# Patient Record
Sex: MALE | Race: WHITE | NOT HISPANIC OR LATINO | Employment: FULL TIME | RURAL
[De-identification: names, ages, dates, MRNs, and addresses within clinical notes are randomized per-mention and may not be internally consistent; named-entity substitution may affect disease eponyms.]

---

## 2017-03-20 ENCOUNTER — OFFICE VISIT (OUTPATIENT)
Dept: FAMILY MEDICINE CLINIC | Facility: CLINIC | Age: 27
End: 2017-03-20

## 2017-03-20 VITALS
BODY MASS INDEX: 30.21 KG/M2 | OXYGEN SATURATION: 98 % | TEMPERATURE: 99.1 F | HEIGHT: 70 IN | HEART RATE: 134 BPM | RESPIRATION RATE: 18 BRPM | DIASTOLIC BLOOD PRESSURE: 88 MMHG | SYSTOLIC BLOOD PRESSURE: 132 MMHG | WEIGHT: 211 LBS

## 2017-03-20 DIAGNOSIS — R30.0 DYSURIA: ICD-10-CM

## 2017-03-20 DIAGNOSIS — R36.9 URETHRAL DISCHARGE: Primary | ICD-10-CM

## 2017-03-20 PROBLEM — Z00.00 WELLNESS EXAMINATION: Status: ACTIVE | Noted: 2017-03-20

## 2017-03-20 PROCEDURE — 99213 OFFICE O/P EST LOW 20 MIN: CPT | Performed by: FAMILY MEDICINE

## 2017-03-20 RX ORDER — DOXYCYCLINE HYCLATE 100 MG/1
100 TABLET, DELAYED RELEASE ORAL 2 TIMES DAILY
Qty: 28 TABLET | Refills: 0 | Status: SHIPPED | OUTPATIENT
Start: 2017-03-20 | End: 2017-05-02 | Stop reason: SDUPTHER

## 2017-03-20 NOTE — PROGRESS NOTES
Subjective   Enrique Rodríguez is a 27 y.o. male presenting with chief complaint of:   Chief Complaint   Patient presents with   • Penile Discharge       History of Present Illness :  Alone..   Has an acute issue today: penile discharge.  Never had before.  Unprotected intercoarse with stable female/relationship last week.  Then clear penile discharge with mild dysuria.  No hematuria, testicular pain, fever, uncontrolled BS, external sores.  No oral sex.      Other chronic problem/s to consider:  DM2: This has been present for years/over a year.  It is chronic.  It is controlled.  Home accuchecks run fasting 120-140, random rarely 200.   No hypoglycemia manifest as syncope/near syncope or diaphoretic/sweating episodes.  A1c below if present.  Sees endo.     The following portions of the patient's history were reviewed and updated as appropriate: allergies, current medications, past family history, past medical history, past social history, past surgical history and problem list.  TCC migrated if needed/reviewed.      Current Outpatient Prescriptions:   •  atorvastatin (LIPITOR) 40 MG tablet, Take 1 tablet by mouth Every Night., Disp: , Rfl:   •  cetirizine (zyrTEC) 10 MG tablet, Take 10 mg by mouth Daily As Needed for allergies., Disp: , Rfl:   •  Insulin Glargine (LANTUS SOLOSTAR) 100 UNIT/ML injection pen, Inject 70 Units under the skin Every Night., Disp: , Rfl:   •  irbesartan (AVAPRO) 150 MG tablet, Take 1 tablet by mouth Every Night., Disp: , Rfl:   •  NOVOLOG FLEXPEN 100 UNIT/ML solution pen-injector sc pen, Daily As Needed. Sliding scale/per Dr Martinez, Disp: , Rfl:   •  SUMAtriptan (IMITREX) 50 MG tablet, Take one tablet at onset of headache. May repeat dose one time in 2 hours if headache not relieved., Disp: , Rfl:     No Known Allergies    Review of Systems  GENERAL:  Active home/work. Sleep is ok. No fever now.  ENDO:  No syncope, near or diaphoretic sweaty spells.  BS Ok: without download.  HEENT: No  head injury  headache   CHEST: No chest wall tenderness or mass. No cough,  without wheeze, SOB; no hemoptysis.  CV: No chest pain, palpatations, ankle edema.  GI: No heartburn, dysphagia.  No abdominal pain, diarrhea, constipation, rectal bleeding, or melena.    :  Voids without dysuria, or incontience to completion.  ORTHO: No painful/swollen joints but various on /off sore.  No sore neck or back.  No acute neck or back pain without recent injury.   NEURO: No dizziness, weakness of extremities.  No numbness/parethesias.   PSYCH: No memory loss.  Mood good; not anxious, depressed but/and not suicidal.  Tolerated stress.     Results for orders placed or performed during the hospital encounter of 06/26/16   Clostridium difficile toxin, PCR   Result Value Ref Range    C difficile Toxin Gene NAAT Negative for Toxigenic C diff    Blood Gas, Arterial w Co-Ox   Result Value Ref Range    pH, Arterial 7.39 7.35 - 7.45    pCO2, Arterial 34 (L) 35 - 45 mmHg    pO2, Arterial 91 80 - 100 mmHg    Sodium 133 (L) 135 - 145 mmol/L    Potassium, Arterial 4.0 3.5 - 5.0 mmol/L    HCO3, Arterial 20 (L) 22 - 26 mmol/L    Base Excess -3.7 (L) -2.0 - 2.0    Hemoglobin, Blood Gas 17.9 14.0 - 18.0 g/dL    Hematocrit 53.0 (H) 38.0 - 51.0 %    Oxyhemoglobin 96 94 - 100 %    Carboxyhemoglobin 0.6 0.0 - 5.1 mmHg    Methemoglobin 0.5 0.4 - 1.5 %    Volume % O2 24.2 (H) 15 - 23 %    Site Right Brachial Arter     FIO2 Room Air    Acetone   Result Value Ref Range    Acetone Negative Negative   APTT   Result Value Ref Range    PTT 24.7 24.1 - 34.8 Seconds   Protime-INR   Result Value Ref Range    Protime 12.0 11.9 - 14.6 Seconds    INR 0.86 (L) 0.91 - 1.09   Lactic acid, plasma   Result Value Ref Range    Lactate 1.3 0.5 - 2.0 mmol/L   Comprehensive metabolic panel   Result Value Ref Range    Sodium 136 135 - 145 mmol/L    Potassium 4.5 3.5 - 5.3 mmol/L    Chloride 91 (L) 98 - 110 mmol/L    CO2 24 24 - 31 mmol/L    Glucose 218 (H) 70 - 100 mg/dL     BUN 55 (H) 5 - 21 mg/dL    Creatinine 3.17 (H) 0.5 - 1.4 mg/dL    Calcium 10.0 8.4 - 10.4 mg/dL    Total Protein 9.9 (H) 6.3 - 8.7 g/dL    Albumin 5.7 (H) 3.5 - 5.0 g/dL    Total Bilirubin 1.2 (H) 0.1 - 1.0 mg/dL    Alkaline Phosphatase 159 (H) 24 - 120 Units/L    AST (SGOT) 32 7 - 45 Units/L    ALT (SGPT) 40 0 - 54 Units/L    Anion Gap 21 (H) 4 - 13 mmol/L    Est GFR by Clearance 24 ml/min/1.732   Lipase   Result Value Ref Range    Lipase 70 23 - 203 Units/L   Amylase   Result Value Ref Range    Amylase 95 30 - 110 Units/L   Urinalysis w/Culture if Indicated   Result Value Ref Range    Color Yellow     Appearance, UA Cloudy (A) Clear    pH, UA 5.0 5.0 - 8.0    Specific Gravity, UA 1.034 (H) 1.005 - 1.030    Glucose, UA Negative Negative mg/dL    Ketones, UA 15 (A) Negative mg/dL    Bilirubin, UA Negative Negative    Blood, UA Trace (A) Negative    Protein,  (A) Negative mg/dL    Nitrite, UA Negative Negative    Leukocytes, UA Negative Negative    Urobilinogen, UA 1.0 0.2,1.0 E.U./dL   Hemoglobin A1c   Result Value Ref Range    Hemoglobin A1C 9.5 (H) 0.0 - 5.9 %   Urinalysis, Microscopic only   Result Value Ref Range    WBC, UA 6-12 (A) None Seen /hpf    RBC, UA 3-5 (A) None Seen /hpf    Bacteria, UA 1+ (A) None Seen /hpf   CK   Result Value Ref Range    Creatine Kinase 373 (H) 0 - 203 Units/L   Comprehensive metabolic panel   Result Value Ref Range    Sodium 139 135 - 145 mmol/L    Potassium 4.4 3.5 - 5.3 mmol/L    Chloride 102 98 - 110 mmol/L    CO2 25 24 - 31 mmol/L    Glucose 55 (L) 70 - 100 mg/dL    BUN 26 (H) 5 - 21 mg/dL    Creatinine 1.21 0.5 - 1.4 mg/dL    Calcium 8.8 8.4 - 10.4 mg/dL    Total Protein 7.0 6.3 - 8.7 g/dL    Albumin 4.2 3.5 - 5.0 g/dL    Total Bilirubin 1.2 (H) 0.1 - 1.0 mg/dL    Alkaline Phosphatase 102 24 - 120 Units/L    AST (SGOT) 25 7 - 45 Units/L    ALT (SGPT) 28 0 - 54 Units/L    Anion Gap 13 4 - 13 mmol/L    Est GFR by Clearance 73 ml/min/1.732   Shiga-like toxin antigen, EIA    Result Value Ref Range    Shiga Toxin 1 Negative Negative    Shiga Toxin 2 Negative Negative   Comprehensive metabolic panel   Result Value Ref Range    Sodium 143 135 - 145 mmol/L    Potassium 3.9 3.5 - 5.3 mmol/L    Chloride 105 98 - 110 mmol/L    CO2 26 24 - 31 mmol/L    Glucose 40 (C) 70 - 100 mg/dL    BUN 15 5 - 21 mg/dL    Creatinine 0.90 0.5 - 1.4 mg/dL    Calcium 8.8 8.4 - 10.4 mg/dL    Total Protein 6.6 6.3 - 8.7 g/dL    Albumin 3.8 3.5 - 5.0 g/dL    Total Bilirubin 0.7 0.1 - 1.0 mg/dL    Alkaline Phosphatase 86 24 - 120 Units/L    AST (SGOT) 22 7 - 45 Units/L    ALT (SGPT) 22 0 - 54 Units/L    Anion Gap 12 4 - 13 mmol/L    Est GFR by Clearance 103 ml/min/1.732   POCT glucose fingerstick   Result Value Ref Range    Glucose 268 (H) 70 - 100 mg/dL   POCT glucose fingerstick   Result Value Ref Range    Glucose 236 (H) 70 - 100 mg/dL   POCT glucose fingerstick   Result Value Ref Range    Glucose 199 (H) 70 - 100 mg/dL   POCT glucose fingerstick   Result Value Ref Range    Glucose 61 (L) 70 - 100 mg/dL   POCT glucose fingerstick   Result Value Ref Range    Glucose 212 (H) 70 - 100 mg/dL   POCT glucose fingerstick   Result Value Ref Range    Glucose 136 (H) 70 - 100 mg/dL   POCT glucose fingerstick   Result Value Ref Range    Glucose 295 (H) 70 - 100 mg/dL   POCT glucose fingerstick   Result Value Ref Range    Glucose 126 (H) 70 - 100 mg/dL   POCT glucose fingerstick   Result Value Ref Range    Glucose 51 (L) 70 - 100 mg/dL   POCT glucose fingerstick   Result Value Ref Range    Glucose 240 (H) 70 - 100 mg/dL   CBC and Differential   Result Value Ref Range    WBC 19.00 (H) 4.80 - 10.80 K/mcL    RBC 5.82 4.80 - 5.90 M/mcL    Hemoglobin 17.1 14.0 - 18.0 g/dL    Hematocrit 47.4 40.0 - 52.0 %    MCV 81.4 (L) 82.0 - 95.0 fL    MCH 29.4 28.0 - 32.0 pg    MCHC 36.1 (H) 33.0 - 36.0 gm/dL    RDW-SD 38.9 (L) 40.0 - 54.0 fL    RDW-CV 13.2 12.0 - 15.0 %    RDW 13.2 12 - 15 %    Platelets 279 130 - 400 K/mcL    Neutrophil  Rel % 77.80 39.00 - 78.00 %    Lymphocyte Rel % 11.10 (L) 15.00 - 45.00 %    Monocyte Rel % 10.50 4.00 - 12.00 %    Eosinophil Rel % 0.10 0.00 - 4.00 %    Basophil Rel % 0.10 0.00 - 2.00 %    Neutrophils Absolute 14.80 (H) 1.87 - 8.40 K/mcL    Lymphocytes Absolute 2.10 0.72 - 4.86 K/mcL    Monocytes Absolute 1.99 (H) 0.19 - 1.30 K/mcL    Eosinophils Absolute 0.02 0.00 - 0.70 K/mcL    Basophils Absolute 0.02 0.00 - 0.20 K/mcL    Differential Type Slide Scan     Platelet Morphology Normal     RBC Morphology Normal    CBC and Differential   Result Value Ref Range    WBC 8.65 4.80 - 10.80 K/mcL    RBC 5.00 4.80 - 5.90 M/mcL    Hemoglobin 14.5 14.0 - 18.0 g/dL    Hematocrit 41.6 40.0 - 52.0 %    MCV 83.2 82.0 - 95.0 fL    MCH 29.0 28.0 - 32.0 pg    MCHC 34.9 33.0 - 36.0 gm/dL    RDW-SD 40.0 40.0 - 54.0 fL    RDW-CV 13.4 12.0 - 15.0 %    RDW 13.4 12 - 15 %    Platelets 208 130 - 400 K/mcL    Neutrophil Rel % 52.70 39.00 - 78.00 %    Lymphocyte Rel % 31.40 15.00 - 45.00 %    Monocyte Rel % 14.20 (H) 4.00 - 12.00 %    Eosinophil Rel % 1.20 0.00 - 4.00 %    Basophil Rel % 0.20 0.00 - 2.00 %    Neutrophils Absolute 4.55 1.87 - 8.40 K/mcL    Lymphocytes Absolute 2.72 0.72 - 4.86 K/mcL    Monocytes Absolute 1.23 0.19 - 1.30 K/mcL    Eosinophils Absolute 0.10 0.00 - 0.70 K/mcL    Basophils Absolute 0.02 0.00 - 0.20 K/mcL    Differential Type AUTO     Platelet Morphology Normal    CBC and Differential   Result Value Ref Range    WBC 10.32 4.80 - 10.80 K/mcL    RBC 4.81 4.80 - 5.90 M/mcL    Hemoglobin 13.8 (L) 14.0 - 18.0 g/dL    Hematocrit 40.2 40.0 - 52.0 %    MCV 83.6 82.0 - 95.0 fL    MCH 28.7 28.0 - 32.0 pg    MCHC 34.3 33.0 - 36.0 gm/dL    RDW-SD 39.5 (L) 40.0 - 54.0 fL    RDW-CV 13.1 12.0 - 15.0 %    RDW 13.1 12 - 15 %    Platelets 192 130 - 400 K/mcL    Neutrophil Rel % 66.10 39.00 - 78.00 %    Lymphocyte Rel % 20.30 15.00 - 45.00 %    Monocyte Rel % 11.80 4.00 - 12.00 %    Eosinophil Rel % 1.40 0.00 - 4.00 %    Basophil  "Rel % 0.20 0.00 - 2.00 %    Neutrophils Absolute 6.82 1.87 - 8.40 K/mcL    Lymphocytes Absolute 2.10 0.72 - 4.86 K/mcL    Monocytes Absolute 1.22 0.19 - 1.30 K/mcL    Eosinophils Absolute 0.14 0.00 - 0.70 K/mcL    Basophils Absolute 0.02 0.00 - 0.20 K/mcL    nRBC 0 0 - 0 /100 WBC    Differential Type AUTO     Platelet Morphology Normal        CBC:     BMP:    THYROID:      Invalid input(s): T4  LIPID:      Invalid input(s): TOTAL CHOLESTROL, TRIGLYCERIDES  PSA:      Lab Results   Component Value Date    HGBA1C 9.5 (H) 06/26/2016       Lab Results   Component Value Date     06/28/2016     06/27/2016     06/26/2016    K 3.9 06/28/2016    K 4.4 06/27/2016    K 4.5 06/26/2016     06/28/2016     06/27/2016    CL 91 (L) 06/26/2016    CO2 26 06/28/2016    CO2 25 06/27/2016    CO2 24 06/26/2016    GLUCOSE 40 (C) 06/28/2016    GLUCOSE 55 (L) 06/27/2016    GLUCOSE 218 (H) 06/26/2016    BUN 15 06/28/2016    BUN 26 (H) 06/27/2016    BUN 55 (H) 06/26/2016    CREATININE 0.90 06/28/2016    CREATININE 1.21 06/27/2016    CREATININE 3.17 (H) 06/26/2016    CALCIUM 8.8 06/28/2016    CALCIUM 8.8 06/27/2016    CALCIUM 10.0 06/26/2016    EGFRCLEARA 103 06/28/2016    EGFRCLEARA 73 06/27/2016    EGFRCLEARA 24 06/26/2016       No results found for: PSA     Objective   Visit Vitals   • /88 (BP Location: Right arm, Patient Position: Sitting, Cuff Size: Large Adult)   • Pulse (!) 134   • Temp 99.1 °F (37.3 °C) (Oral)   • Resp 18   • Ht 70\" (177.8 cm)   • Wt 211 lb (95.7 kg)   • SpO2 98%   • BMI 30.28 kg/m2       Physical Exam  GENERAL:  Well nourished/developed in no acute distress. Obese   SKIN: Turgor excellent, without wound, rash, lesion.  HEENT: Normal cephalic without trauma.  Pupils equal round reactive to light. Extraocular motions full without nystagmus.   External canals nonobstructive nontender without reddness. Tymphatic membranes carmela with megan structures intact.   Oral cavity without growths, " exudates, and moist.  Posterior pharnyx without mass, obstruction, reddness.  No thyroidmegaly, mass, tenderness, lymphadenopathy and supple.  CV: Regular rhythm.  No murmur, gallop,  edema.  CHEST: No chest wall tenderness or mass.   LUNGS: Symmetric motion with clear to auscultation.  ABD: Soft, nontender without mass.   : Circ penis.  No external lesions.  Meatus adequate.  No penile sores/induration.  Clear fluid discharge.  Testes smooth 3 cm/nontender. 1 cm freely movable R inguinal node.   ORTHO: Symmetric extremities without swelling/point tenderness.   NEURO: CN 2-12 grossly intact.  Symmetric facies.  UE/LE   5/5 strength throughout.  Nonfocal use extremities. Speech clear.     PSYCH: Oriented x 3.  Pleasant calm, well kept.  Purposeful/directed conservation with intact short/long gross memory.     Assessment/Plan     1. Urethral discharge  Urethritis likely; likely chlaymdia   - Cadmium Std Profile, Blood / Ur  - doxycycline (DORYX) 100 MG enteric coated tablet; Take 1 tablet by mouth 2 (Two) Times a Day.  Dispense: 28 tablet; Refill: 0    2. Dysuria  same  - Cadmium Std Profile, Blood / Ur  - doxycycline (DORYX) 100 MG enteric coated tablet; Take 1 tablet by mouth 2 (Two) Times a Day.  Dispense: 28 tablet; Refill: 0      Rx: reviewed.   Warned sunshine protection.   LAB: reviewed/above  Advised safe sex.   There are no Patient Instructions on file for this visit.    Follow up: Return for as needed.

## 2017-03-21 LAB
B2 MICROGLOB UR-MCNC: 148 UG/L (ref 0–300)
B2 MICROGLOB/CREAT UR: 163 UG/G CREAT (ref 0–300)
CADMIUM 24H UR-MCNC: ABNORMAL UG/L
CADMIUM BLD-MCNC: 2 UG/L (ref 0–1.2)
CREAT UR-MCNC: 0.91 G/L (ref 0.3–3)

## 2017-04-26 ENCOUNTER — LAB (OUTPATIENT)
Dept: FAMILY MEDICINE CLINIC | Facility: CLINIC | Age: 27
End: 2017-04-26

## 2017-04-26 ENCOUNTER — TELEPHONE (OUTPATIENT)
Dept: FAMILY MEDICINE CLINIC | Facility: CLINIC | Age: 27
End: 2017-04-26

## 2017-04-26 DIAGNOSIS — Z20.9 EXPOSURE TO COMMUNICABLE DISEASE: Primary | ICD-10-CM

## 2017-04-26 NOTE — TELEPHONE ENCOUNTER
"Pt called \"on my last visit i was diagnosed of STD and still having some discharge and I just got off the boat today and want to know if I need to be seen? Or need more medication?\" pt has not been sexually active since dx  "

## 2017-05-01 LAB — C TRACH RRNA SPEC QL NAA+PROBE: NEGATIVE

## 2017-05-02 DIAGNOSIS — R30.0 DYSURIA: ICD-10-CM

## 2017-05-02 DIAGNOSIS — R36.9 URETHRAL DISCHARGE: ICD-10-CM

## 2017-05-02 RX ORDER — DOXYCYCLINE HYCLATE 100 MG/1
100 TABLET, DELAYED RELEASE ORAL 2 TIMES DAILY
Qty: 28 TABLET | Refills: 0 | Status: SHIPPED | OUTPATIENT
Start: 2017-05-02 | End: 2017-11-01

## 2017-10-17 ENCOUNTER — TELEPHONE (OUTPATIENT)
Dept: PODIATRY | Facility: CLINIC | Age: 27
End: 2017-10-17

## 2017-10-17 NOTE — TELEPHONE ENCOUNTER
Left message for Mr. Rodríguez of his appointment for tomorrow at 930 am and advised him if he had any questions or need to reschedule to please call the office at [phone]

## 2017-11-01 ENCOUNTER — OFFICE VISIT (OUTPATIENT)
Dept: FAMILY MEDICINE CLINIC | Facility: CLINIC | Age: 27
End: 2017-11-01

## 2017-11-01 VITALS
DIASTOLIC BLOOD PRESSURE: 74 MMHG | WEIGHT: 218 LBS | HEART RATE: 82 BPM | OXYGEN SATURATION: 99 % | SYSTOLIC BLOOD PRESSURE: 126 MMHG | BODY MASS INDEX: 31.21 KG/M2 | HEIGHT: 70 IN | RESPIRATION RATE: 18 BRPM | TEMPERATURE: 99 F

## 2017-11-01 DIAGNOSIS — I10 ESSENTIAL HYPERTENSION: Chronic | ICD-10-CM

## 2017-11-01 DIAGNOSIS — G43.909 MIGRAINE WITHOUT STATUS MIGRAINOSUS, NOT INTRACTABLE, UNSPECIFIED MIGRAINE TYPE: Chronic | ICD-10-CM

## 2017-11-01 DIAGNOSIS — Z00.00 WELLNESS EXAMINATION: ICD-10-CM

## 2017-11-01 DIAGNOSIS — IMO0001 UNCONTROLLED TYPE 1 DIABETES MELLITUS WITHOUT COMPLICATION: Primary | Chronic | ICD-10-CM

## 2017-11-01 DIAGNOSIS — E78.5 HYPERLIPIDEMIA, UNSPECIFIED HYPERLIPIDEMIA TYPE: Chronic | ICD-10-CM

## 2017-11-01 PROCEDURE — 99395 PREV VISIT EST AGE 18-39: CPT | Performed by: FAMILY MEDICINE

## 2017-12-12 ENCOUNTER — TELEPHONE (OUTPATIENT)
Dept: FAMILY MEDICINE CLINIC | Facility: CLINIC | Age: 27
End: 2017-12-12

## 2017-12-12 NOTE — TELEPHONE ENCOUNTER
FYI: Patient had me hold off until after 12.6.17 for the Endocrinology referral. I finally reached him and he is going to just continue with the one he has and he has an appointment this week. Cancelling our referral.

## 2018-06-18 DIAGNOSIS — R30.0 DYSURIA: ICD-10-CM

## 2018-06-18 DIAGNOSIS — R36.9 URETHRAL DISCHARGE: ICD-10-CM

## 2018-06-18 RX ORDER — DOXYCYCLINE HYCLATE 100 MG/1
100 TABLET, DELAYED RELEASE ORAL 2 TIMES DAILY
Qty: 28 TABLET | Refills: 0 | Status: SHIPPED | OUTPATIENT
Start: 2018-06-18 | End: 2018-07-02

## 2019-03-25 ENCOUNTER — TELEPHONE (OUTPATIENT)
Dept: FAMILY MEDICINE CLINIC | Facility: CLINIC | Age: 29
End: 2019-03-25

## 2019-03-25 RX ORDER — AZITHROMYCIN 250 MG/1
TABLET, FILM COATED ORAL
Qty: 6 TABLET | Refills: 0 | Status: ON HOLD | OUTPATIENT
Start: 2019-03-25 | End: 2019-10-26

## 2019-03-25 NOTE — TELEPHONE ENCOUNTER
Allergies   Allergen Reactions   • Other      CATS- Sneezing     If no new allergies; may have Z pack as long as not on an antidepressant or heart Rx

## 2019-03-25 NOTE — TELEPHONE ENCOUNTER
CALLED AND STATES HE HAS A HEAD COLD.  HEAD STOPPED UP, COUGH,   WANTS TO KNOW IF YOU WILL CALL HIM IN SOMETHING.  [phone]  NATAN

## 2019-08-06 NOTE — PROGRESS NOTES
Patient called this morning c/o rash call was transferred to RN pool at the [facility] 15 minutes later RN calls [facility] office saying they spoke to patient possible poison ivy they were suppose to send message to pcp ? I don't see any messages ? Subjective   Enrique Rodríguez is a 27 y.o. male presenting with chief complaint of:   Chief Complaint   Patient presents with   • Annual Exam     work pe       History of Present Illness :  Alone.  Here for primarily an acute issue today; needs form for work.   Has multiple problems to consider; ? interval appointment.  One of these problems is DM1.  Had been seeing Danyell; was referred to Faith/Margaret (who is retiring).  He is not interested in the pump Margaret recommended.         Other chronic problem/s to consider:   HTN.  The HTN has been present for years/it is chronic.  The HTN is assumed essential/without testing needed to look for other.  The HTN is controlled manifest by todays blood pressure and no home monitoring.  Associated illness below.   DM1: This has been present for years/over a year.  It is chronic.  It is generally not controlled.  Home accuchecks run fasting 100, random 140.   No hypoglycemia manifest as syncope/near syncope or diaphoretic/sweating episodes.  A1c below if available.  Diet loose.   Hyperlipidemia: This has been present for years/over a year.  It is chronic.   It is generally controlled.   .  Labs are needed periodic to monitor condition/safetly.   Rx therapy Lipitor  Migraines:  Years/chronic.  Has opinion migraines.  Imitrex has helped; has one a month but not bad enough to want imitrex.     Has an/another acute issue today: none.    The following portions of the patient's history were reviewed and updated as appropriate: allergies, current medications, past family history, past medical history, past social history, past surgical history and problem list.  Records acquired and reviewed; TCC migrated.      Current Outpatient Prescriptions:   •  atorvastatin (LIPITOR) 40 MG tablet, Take 1 tablet by mouth Every Night., Disp: , Rfl:   •  cetirizine (zyrTEC) 10 MG tablet, Take 10 mg by mouth Daily As Needed for allergies., Disp: , Rfl:   •  Insulin Degludec (TRESIBA FLEXTOUCH) 200  UNIT/ML solution pen-injector, Inject 90 Units under the skin Every Night., Disp: , Rfl:   •  irbesartan (AVAPRO) 150 MG tablet, Take 1 tablet by mouth Every Night., Disp: , Rfl:   •  NOVOLOG FLEXPEN 100 UNIT/ML solution pen-injector sc pen, Daily As Needed. Sliding scale/per Dr Martinez, Disp: , Rfl:   •  SUMAtriptan (IMITREX) 50 MG tablet, Take one tablet at onset of headache. May repeat dose one time in 2 hours if headache not relieved., Disp: , Rfl:     Allergies   Allergen Reactions   • Other      CATS- Sneezing       Review of Systems  GENERAL:  Active work/home and some weights. Sleep is ok; apnea denied. No fever now.  ENDO:  No syncope, near or diaphoretic sweaty spells.  BS Ok: without download noted.  HEENT: No head injury occ/same headache,  No vision change, No significant hearing loss.  Ears without pain/drainage.  No sore throat.  No significant nasal/sinus congestion/drainage. No epistaxis.  CHEST: No chest wall tenderness or mass. No cough,  without wheeze.  No SOB; no hemoptysis.  CV: No chest pain, palpitations, ankle edema.  GI: No heartburn, dysphagia.  No abdominal pain, diarrhea, constipation.  No rectal bleeding, or melena.    :  Voids without dysuria, or incontinence to completion.  ORTHO: No painful/swollen joints but various on /off sore.  No sore neck or back.  No acute neck or back pain without recent injury.   NEURO: No dizziness, weakness of extremities.  No numbness/paresthesias.   PSYCH: No memory loss.  Mood good; not that anxious, depressed but/and not suicidal.  Tries to tolerate stress .     Results for orders placed or performed in visit on 04/26/17   Chlamydia Trach, DNA Probe   Result Value Ref Range    Chlamydia trachomatis, TATIANA Negative Negative       Lab Results   Component Value Date    HGBA1C 9.5 (H) 06/26/2016       Lab Results   Component Value Date     06/28/2016     06/27/2016     06/26/2016    K 3.9 06/28/2016    K 4.4 06/27/2016    K 4.5  "06/26/2016     06/28/2016     06/27/2016    CL 91 (L) 06/26/2016    CO2 26 06/28/2016    CO2 25 06/27/2016    CO2 24 06/26/2016    GLUCOSE 40 (C) 06/28/2016    GLUCOSE 55 (L) 06/27/2016    GLUCOSE 218 (H) 06/26/2016    BUN 15 06/28/2016    BUN 26 (H) 06/27/2016    BUN 55 (H) 06/26/2016    CREATININE 0.90 06/28/2016    CREATININE 1.21 06/27/2016    CREATININE 3.17 (H) 06/26/2016    CALCIUM 8.8 06/28/2016    CALCIUM 8.8 06/27/2016    CALCIUM 10.0 06/26/2016    EGFRCLEARA 103 06/28/2016    EGFRCLEARA 73 06/27/2016    EGFRCLEARA 24 06/26/2016       No results found for: PSA     Lab Results:  CBC:    Lab Results - Last 18 Months  Lab Units 06/28/16  0534 06/27/16  0542 06/26/16  0656 06/26/16  0651   WBC K/mcL 10.32 8.65 19.00*  --    HEMATOCRIT % 40.2 41.6 47.4 53.0*     CMP:    Lab Results - Last 18 Months  Lab Units 06/28/16  0534 06/27/16  0542 06/26/16  0656 06/26/16  0651   SODIUM mmol/L 143 139 136 133*   CHLORIDE mmol/L 105 102 91*  --    TOTAL CO2 mmol/L 26 25 24  --    BUN mg/dL 15 26* 55*  --    CREATININE mg/dL 0.90 1.21 3.17*  --    CALCIUM mg/dL 8.8 8.8 10.0  --      THYROID:No results for input(s): TSH, T3FREE, FREET4 in the last 22706 hours.    Invalid input(s): T3, T4  A1C:    Lab Results - Last 18 Months  Lab Units 06/26/16  0656   HEMOGLOBIN A1C % 9.5*       Objective   /74  Pulse 82  Temp 99 °F (37.2 °C) (Oral)   Resp 18  Ht 70\" (177.8 cm)  Wt 218 lb (98.9 kg)  SpO2 99%  BMI 31.28 kg/m2    Physical Exam  GENERAL:  Well nourished/developed in no acute distress.  Muscular.   SKIN: Turgor excellent, without wound, rash, lesion  HEENT: Normal cephalic without trauma.  Pupils equal round reactive to light. Extraocular motions full without nystagmus.   External canals nonobstructive nontender without reddness. Tymphatic membranes carmela with megan structures intact.   Oral cavity without growths, exudates, and moist.  Posterior pharynx without mass, obstruction, redness.  No " thyromegaly, mass, tenderness, lymphadenopathy and supple.  CV: Regular rhythm.  No murmur, gallop,  edema. Posterior pulses intact.  No carotid bruits.  CHEST: No chest wall tenderness or mass.   LUNGS: Symmetric motion with clear to auscultation.   ABD: Soft, nontender without mass.   ORTHO: Symmetric extremities without swelling/point tenderness.  Full gross range of motion.  NEURO: CN 2-12 grossly intact.  Symmetric facies. 1/4 x bicep knee equal reflexes.  UE/LE   5/5 strength throughout.  Nonfocal use extremities. Speech clear.    PSYCH: Oriented x 3.  Pleasant calm, well kept.  Purposeful/directed conservation with intact short/long gross memory.     Assessment/Plan     1. Uncontrolled type 1 diabetes mellitus without complication  He has option of [facility]  - Ambulatory Referral to Endocrinology    2. Essential hypertension  controlled    3. Hyperlipidemia, unspecified hyperlipidemia type  lipitor treated    4. Migraine without status migrainosus, not intractable, unspecified migraine type  Doing well    5. Wellness examination-done      Rx: reviewed.  Any other changes above and:   LAB: reviewed/above.  Orders above and:   Wrap-up/other instructions: discussed as applicable  Regular cardio exercise something everyone should consider and try to do; even if health limitations (ie find that exercise UE/LE/cardio that they can tolerate).  Normal weight a goal for everyone.  Healthy diet helpful for weight management, illness prevention.    Flu shot discussed; missed and he was called and asked if he wants to come back.   There are no Patient Instructions on file for this visit.    Follow up: Return for Dr Louie-at least yearly.  Future Appointments  Date Time Provider Department Center   11/7/2018 9:30 AM Wyatt Louie MD [facility] None

## 2019-10-25 ENCOUNTER — TELEPHONE (OUTPATIENT)
Dept: FAMILY MEDICINE CLINIC | Facility: CLINIC | Age: 29
End: 2019-10-25

## 2019-10-25 RX ORDER — ONDANSETRON 4 MG/1
4 TABLET, FILM COATED ORAL EVERY 8 HOURS PRN
Qty: 6 TABLET | Refills: 0 | Status: SHIPPED | OUTPATIENT
Start: 2019-10-25 | End: 2022-01-26

## 2019-10-25 NOTE — TELEPHONE ENCOUNTER
Pt called for something to help with vomiting, he states he has been having problems most of the day with his stomach being upset.

## 2019-10-25 NOTE — TELEPHONE ENCOUNTER
He would come in if you want him to or just Zofran and come in Monday.  He was open to what you wanted.

## 2019-10-25 NOTE — TELEPHONE ENCOUNTER
Try zofran 4 mg every 6 #8  Assume he will work hard to watch his BS and keeping some fluids down  Look forward to seeing him Monday

## 2019-10-26 ENCOUNTER — APPOINTMENT (OUTPATIENT)
Dept: GENERAL RADIOLOGY | Facility: HOSPITAL | Age: 29
End: 2019-10-26

## 2019-10-26 ENCOUNTER — HOSPITAL ENCOUNTER (INPATIENT)
Facility: HOSPITAL | Age: 29
LOS: 1 days | Discharge: HOME | End: 2019-10-27
Attending: FAMILY MEDICINE | Admitting: FAMILY MEDICINE

## 2019-10-26 DIAGNOSIS — E10.10 DIABETIC KETOACIDOSIS WITHOUT COMA ASSOCIATED WITH TYPE 1 DIABETES MELLITUS (HCC): Primary | ICD-10-CM

## 2019-10-26 DIAGNOSIS — N17.9 ACUTE RENAL FAILURE, UNSPECIFIED ACUTE RENAL FAILURE TYPE (HCC): ICD-10-CM

## 2019-10-26 LAB
ACETONE BLD QL: ABNORMAL
ALBUMIN SERPL-MCNC: 4.6 G/DL (ref 3.5–5.2)
ALBUMIN SERPL-MCNC: 4.8 G/DL (ref 3.5–5.2)
ALBUMIN/GLOB SERPL: 1.5 G/DL
ALBUMIN/GLOB SERPL: 1.8 G/DL
ALP SERPL-CCNC: 106 U/L (ref 39–117)
ALP SERPL-CCNC: 113 U/L (ref 39–117)
ALT SERPL W P-5'-P-CCNC: 19 U/L (ref 1–41)
ALT SERPL W P-5'-P-CCNC: 23 U/L (ref 1–41)
ANION GAP SERPL CALCULATED.3IONS-SCNC: 15 MMOL/L (ref 5–15)
ANION GAP SERPL CALCULATED.3IONS-SCNC: 17 MMOL/L (ref 5–15)
ANION GAP SERPL CALCULATED.3IONS-SCNC: 18 MMOL/L (ref 5–15)
ANION GAP SERPL CALCULATED.3IONS-SCNC: 28 MMOL/L (ref 5–15)
ANION GAP SERPL CALCULATED.3IONS-SCNC: 33 MMOL/L (ref 5–15)
ARTERIAL PATENCY WRIST A: ABNORMAL
AST SERPL-CCNC: 12 U/L (ref 1–40)
AST SERPL-CCNC: 13 U/L (ref 1–40)
ATMOSPHERIC PRESS: 736 MMHG
ATMOSPHERIC PRESS: 740 MMHG
ATMOSPHERIC PRESS: 742 MMHG
ATMOSPHERIC PRESS: 745 MMHG
BASE EXCESS BLDA CALC-SCNC: -13.1 MMOL/L (ref 0–2)
BASE EXCESS BLDV CALC-SCNC: -13.6 MMOL/L (ref 0–2)
BASE EXCESS BLDV CALC-SCNC: -3.8 MMOL/L (ref 0–2)
BASE EXCESS BLDV CALC-SCNC: -6.8 MMOL/L (ref 0–2)
BASOPHILS # BLD AUTO: 0.03 10*3/MM3 (ref 0–0.2)
BASOPHILS # BLD AUTO: 0.04 10*3/MM3 (ref 0–0.2)
BASOPHILS NFR BLD AUTO: 0.1 % (ref 0–1.5)
BASOPHILS NFR BLD AUTO: 0.2 % (ref 0–1.5)
BDY SITE: ABNORMAL
BILIRUB SERPL-MCNC: 0.5 MG/DL (ref 0.2–1.2)
BILIRUB SERPL-MCNC: 0.5 MG/DL (ref 0.2–1.2)
BILIRUB UR QL STRIP: NEGATIVE
BODY TEMPERATURE: 37 C
BUN BLD-MCNC: 29 MG/DL (ref 6–20)
BUN BLD-MCNC: 36 MG/DL (ref 6–20)
BUN BLD-MCNC: 42 MG/DL (ref 6–20)
BUN BLD-MCNC: 48 MG/DL (ref 6–20)
BUN BLD-MCNC: 51 MG/DL (ref 6–20)
BUN/CREAT SERPL: 14.6 (ref 7–25)
BUN/CREAT SERPL: 16.1 (ref 7–25)
BUN/CREAT SERPL: 17.4 (ref 7–25)
BUN/CREAT SERPL: 17.9 (ref 7–25)
BUN/CREAT SERPL: 18 (ref 7–25)
CALCIUM SPEC-SCNC: 8.2 MG/DL (ref 8.6–10.5)
CALCIUM SPEC-SCNC: 8.9 MG/DL (ref 8.6–10.5)
CALCIUM SPEC-SCNC: 9.1 MG/DL (ref 8.6–10.5)
CALCIUM SPEC-SCNC: 9.2 MG/DL (ref 8.6–10.5)
CALCIUM SPEC-SCNC: 9.4 MG/DL (ref 8.6–10.5)
CHLORIDE SERPL-SCNC: 102 MMOL/L (ref 98–107)
CHLORIDE SERPL-SCNC: 104 MMOL/L (ref 98–107)
CHLORIDE SERPL-SCNC: 105 MMOL/L (ref 98–107)
CHLORIDE SERPL-SCNC: 85 MMOL/L (ref 98–107)
CHLORIDE SERPL-SCNC: 94 MMOL/L (ref 98–107)
CLARITY UR: CLEAR
CO2 SERPL-SCNC: 12 MMOL/L (ref 22–29)
CO2 SERPL-SCNC: 15 MMOL/L (ref 22–29)
CO2 SERPL-SCNC: 19 MMOL/L (ref 22–29)
CO2 SERPL-SCNC: 20 MMOL/L (ref 22–29)
CO2 SERPL-SCNC: 21 MMOL/L (ref 22–29)
COLOR UR: YELLOW
CREAT BLD-MCNC: 1.62 MG/DL (ref 0.76–1.27)
CREAT BLD-MCNC: 2.07 MG/DL (ref 0.76–1.27)
CREAT BLD-MCNC: 2.33 MG/DL (ref 0.76–1.27)
CREAT BLD-MCNC: 2.98 MG/DL (ref 0.76–1.27)
CREAT BLD-MCNC: 3.5 MG/DL (ref 0.76–1.27)
D-LACTATE SERPL-SCNC: 0.9 MMOL/L (ref 0.5–2)
D-LACTATE SERPL-SCNC: 2.1 MMOL/L (ref 0.5–2)
DEPRECATED RDW RBC AUTO: 37.9 FL (ref 37–54)
DEPRECATED RDW RBC AUTO: 38.1 FL (ref 37–54)
EOSINOPHIL # BLD AUTO: 0 10*3/MM3 (ref 0–0.4)
EOSINOPHIL # BLD AUTO: 0 10*3/MM3 (ref 0–0.4)
EOSINOPHIL NFR BLD AUTO: 0 % (ref 0.3–6.2)
EOSINOPHIL NFR BLD AUTO: 0 % (ref 0.3–6.2)
ERYTHROCYTE [DISTWIDTH] IN BLOOD BY AUTOMATED COUNT: 12.3 % (ref 12.3–15.4)
ERYTHROCYTE [DISTWIDTH] IN BLOOD BY AUTOMATED COUNT: 12.5 % (ref 12.3–15.4)
GFR SERPL CREATININE-BSD FRML MDRD: 21 ML/MIN/1.73
GFR SERPL CREATININE-BSD FRML MDRD: 25 ML/MIN/1.73
GFR SERPL CREATININE-BSD FRML MDRD: 34 ML/MIN/1.73
GFR SERPL CREATININE-BSD FRML MDRD: 38 ML/MIN/1.73
GFR SERPL CREATININE-BSD FRML MDRD: 51 ML/MIN/1.73
GLOBULIN UR ELPH-MCNC: 2.5 GM/DL
GLOBULIN UR ELPH-MCNC: 3.1 GM/DL
GLUCOSE BLD-MCNC: 124 MG/DL (ref 65–99)
GLUCOSE BLD-MCNC: 131 MG/DL (ref 65–99)
GLUCOSE BLD-MCNC: 220 MG/DL (ref 65–99)
GLUCOSE BLD-MCNC: 436 MG/DL (ref 65–99)
GLUCOSE BLD-MCNC: 573 MG/DL (ref 65–99)
GLUCOSE BLDC GLUCOMTR-MCNC: 113 MG/DL (ref 70–130)
GLUCOSE BLDC GLUCOMTR-MCNC: 117 MG/DL (ref 70–130)
GLUCOSE BLDC GLUCOMTR-MCNC: 204 MG/DL (ref 70–130)
GLUCOSE BLDC GLUCOMTR-MCNC: 253 MG/DL (ref 70–130)
GLUCOSE BLDC GLUCOMTR-MCNC: 300 MG/DL (ref 70–130)
GLUCOSE BLDC GLUCOMTR-MCNC: 348 MG/DL (ref 70–130)
GLUCOSE BLDC GLUCOMTR-MCNC: 369 MG/DL (ref 70–130)
GLUCOSE BLDC GLUCOMTR-MCNC: 371 MG/DL (ref 70–130)
GLUCOSE BLDC GLUCOMTR-MCNC: 391 MG/DL (ref 70–130)
GLUCOSE BLDC GLUCOMTR-MCNC: 404 MG/DL (ref 70–130)
GLUCOSE BLDC GLUCOMTR-MCNC: 416 MG/DL (ref 70–130)
GLUCOSE BLDC GLUCOMTR-MCNC: 440 MG/DL (ref 70–130)
GLUCOSE BLDC GLUCOMTR-MCNC: 92 MG/DL (ref 70–130)
GLUCOSE UR STRIP-MCNC: ABNORMAL MG/DL
HBA1C MFR BLD: 10.5 % (ref 4.8–5.6)
HCO3 BLDA-SCNC: 11.2 MMOL/L (ref 20–26)
HCO3 BLDV-SCNC: 10.6 MMOL/L (ref 22–28)
HCO3 BLDV-SCNC: 18.7 MMOL/L (ref 22–28)
HCO3 BLDV-SCNC: 21.5 MMOL/L (ref 22–28)
HCT VFR BLD AUTO: 42.4 % (ref 37.5–51)
HCT VFR BLD AUTO: 44.7 % (ref 37.5–51)
HGB BLD-MCNC: 14.5 G/DL (ref 13–17.7)
HGB BLD-MCNC: 15 G/DL (ref 13–17.7)
HGB UR QL STRIP.AUTO: NEGATIVE
HOLD SPECIMEN: NORMAL
HOROWITZ INDEX BLD+IHG-RTO: 21 %
IMM GRANULOCYTES # BLD AUTO: 0.15 10*3/MM3 (ref 0–0.05)
IMM GRANULOCYTES # BLD AUTO: 0.16 10*3/MM3 (ref 0–0.05)
IMM GRANULOCYTES NFR BLD AUTO: 0.7 % (ref 0–0.5)
IMM GRANULOCYTES NFR BLD AUTO: 0.7 % (ref 0–0.5)
KETONES UR QL STRIP: ABNORMAL
LEUKOCYTE ESTERASE UR QL STRIP.AUTO: NEGATIVE
LIPASE SERPL-CCNC: 66 U/L (ref 13–60)
LYMPHOCYTES # BLD AUTO: 1.52 10*3/MM3 (ref 0.7–3.1)
LYMPHOCYTES # BLD AUTO: 2.22 10*3/MM3 (ref 0.7–3.1)
LYMPHOCYTES NFR BLD AUTO: 10.1 % (ref 19.6–45.3)
LYMPHOCYTES NFR BLD AUTO: 6.3 % (ref 19.6–45.3)
Lab: ABNORMAL
MAGNESIUM SERPL-MCNC: 2.1 MG/DL (ref 1.6–2.6)
MAGNESIUM SERPL-MCNC: 2.4 MG/DL (ref 1.6–2.6)
MAGNESIUM SERPL-MCNC: 2.6 MG/DL (ref 1.6–2.6)
MAGNESIUM SERPL-MCNC: 2.7 MG/DL (ref 1.6–2.6)
MAGNESIUM SERPL-MCNC: 3 MG/DL (ref 1.6–2.6)
MCH RBC QN AUTO: 28.6 PG (ref 26.6–33)
MCH RBC QN AUTO: 29 PG (ref 26.6–33)
MCHC RBC AUTO-ENTMCNC: 33.6 G/DL (ref 31.5–35.7)
MCHC RBC AUTO-ENTMCNC: 34.2 G/DL (ref 31.5–35.7)
MCV RBC AUTO: 84.8 FL (ref 79–97)
MCV RBC AUTO: 85.3 FL (ref 79–97)
MODALITY: ABNORMAL
MONOCYTES # BLD AUTO: 1.24 10*3/MM3 (ref 0.1–0.9)
MONOCYTES # BLD AUTO: 1.62 10*3/MM3 (ref 0.1–0.9)
MONOCYTES NFR BLD AUTO: 5.1 % (ref 5–12)
MONOCYTES NFR BLD AUTO: 7.4 % (ref 5–12)
NEUTROPHILS # BLD AUTO: 17.99 10*3/MM3 (ref 1.7–7)
NEUTROPHILS # BLD AUTO: 21.15 10*3/MM3 (ref 1.7–7)
NEUTROPHILS NFR BLD AUTO: 81.7 % (ref 42.7–76)
NEUTROPHILS NFR BLD AUTO: 87.7 % (ref 42.7–76)
NITRITE UR QL STRIP: NEGATIVE
NRBC BLD AUTO-RTO: 0 /100 WBC (ref 0–0.2)
NRBC BLD AUTO-RTO: 0 /100 WBC (ref 0–0.2)
OSMOLALITY SERPL: 324 MOSM/KG (ref 289–308)
PCO2 BLDA: 22.9 MM HG (ref 35–45)
PCO2 BLDV: 21.5 MM HG (ref 41–51)
PCO2 BLDV: 36.6 MM HG (ref 41–51)
PCO2 BLDV: 39 MM HG (ref 41–51)
PH BLDA: 7.3 PH UNITS (ref 7.35–7.45)
PH BLDV: 7.3 PH UNITS (ref 7.32–7.42)
PH BLDV: 7.32 PH UNITS (ref 7.32–7.42)
PH BLDV: 7.35 PH UNITS (ref 7.32–7.42)
PH UR STRIP.AUTO: <=5 [PH] (ref 5–8)
PHOSPHATE SERPL-MCNC: 1.8 MG/DL (ref 2.5–4.5)
PHOSPHATE SERPL-MCNC: 2.5 MG/DL (ref 2.5–4.5)
PHOSPHATE SERPL-MCNC: 2.8 MG/DL (ref 2.5–4.5)
PHOSPHATE SERPL-MCNC: 3.9 MG/DL (ref 2.5–4.5)
PHOSPHATE SERPL-MCNC: 5.5 MG/DL (ref 2.5–4.5)
PLATELET # BLD AUTO: 262 10*3/MM3 (ref 140–450)
PLATELET # BLD AUTO: 305 10*3/MM3 (ref 140–450)
PMV BLD AUTO: 10.3 FL (ref 6–12)
PMV BLD AUTO: 10.6 FL (ref 6–12)
PO2 BLDA: 79.2 MM HG (ref 83–108)
PO2 BLDV: 106 MM HG (ref 27–53)
PO2 BLDV: 29.7 MM HG (ref 27–53)
PO2 BLDV: 49.8 MM HG (ref 27–53)
POTASSIUM BLD-SCNC: 4.2 MMOL/L (ref 3.5–5.2)
POTASSIUM BLD-SCNC: 4.6 MMOL/L (ref 3.5–5.2)
POTASSIUM BLD-SCNC: 4.7 MMOL/L (ref 3.5–5.2)
POTASSIUM BLD-SCNC: 5.3 MMOL/L (ref 3.5–5.2)
POTASSIUM BLD-SCNC: 5.7 MMOL/L (ref 3.5–5.2)
PROT SERPL-MCNC: 7.1 G/DL (ref 6–8.5)
PROT SERPL-MCNC: 7.9 G/DL (ref 6–8.5)
PROT UR QL STRIP: ABNORMAL
RBC # BLD AUTO: 5 10*6/MM3 (ref 4.14–5.8)
RBC # BLD AUTO: 5.24 10*6/MM3 (ref 4.14–5.8)
S PYO AG THROAT QL: NEGATIVE
SAO2 % BLDCOA: 95.6 % (ref 94–99)
SAO2 % BLDCOV: 57.6 % (ref 45–75)
SAO2 % BLDCOV: 84.9 % (ref 45–75)
SAO2 % BLDCOV: 98 % (ref 45–75)
SODIUM BLD-SCNC: 133 MMOL/L (ref 136–145)
SODIUM BLD-SCNC: 134 MMOL/L (ref 136–145)
SODIUM BLD-SCNC: 138 MMOL/L (ref 136–145)
SODIUM BLD-SCNC: 141 MMOL/L (ref 136–145)
SODIUM BLD-SCNC: 142 MMOL/L (ref 136–145)
SP GR UR STRIP: 1.02 (ref 1–1.03)
UROBILINOGEN UR QL STRIP: ABNORMAL
VENTILATOR MODE: ABNORMAL
WBC NRBC COR # BLD: 22.01 10*3/MM3 (ref 3.4–10.8)
WBC NRBC COR # BLD: 24.11 10*3/MM3 (ref 3.4–10.8)
WHOLE BLOOD HOLD SPECIMEN: NORMAL

## 2019-10-26 PROCEDURE — 83036 HEMOGLOBIN GLYCOSYLATED A1C: CPT | Performed by: FAMILY MEDICINE

## 2019-10-26 PROCEDURE — 63710000001 INSULIN DETEMIR PER 5 UNITS: Performed by: FAMILY MEDICINE

## 2019-10-26 PROCEDURE — 85025 COMPLETE CBC W/AUTO DIFF WBC: CPT | Performed by: NURSE PRACTITIONER

## 2019-10-26 PROCEDURE — 25010000002 PROMETHAZINE PER 50 MG: Performed by: NURSE PRACTITIONER

## 2019-10-26 PROCEDURE — 80053 COMPREHEN METABOLIC PANEL: CPT | Performed by: NURSE PRACTITIONER

## 2019-10-26 PROCEDURE — 83735 ASSAY OF MAGNESIUM: CPT | Performed by: FAMILY MEDICINE

## 2019-10-26 PROCEDURE — 84100 ASSAY OF PHOSPHORUS: CPT | Performed by: FAMILY MEDICINE

## 2019-10-26 PROCEDURE — 82009 KETONE BODYS QUAL: CPT | Performed by: NURSE PRACTITIONER

## 2019-10-26 PROCEDURE — 71045 X-RAY EXAM CHEST 1 VIEW: CPT

## 2019-10-26 PROCEDURE — 87081 CULTURE SCREEN ONLY: CPT | Performed by: NURSE PRACTITIONER

## 2019-10-26 PROCEDURE — 80048 BASIC METABOLIC PNL TOTAL CA: CPT | Performed by: FAMILY MEDICINE

## 2019-10-26 PROCEDURE — 83605 ASSAY OF LACTIC ACID: CPT | Performed by: NURSE PRACTITIONER

## 2019-10-26 PROCEDURE — 85025 COMPLETE CBC W/AUTO DIFF WBC: CPT | Performed by: FAMILY MEDICINE

## 2019-10-26 PROCEDURE — 82803 BLOOD GASES ANY COMBINATION: CPT

## 2019-10-26 PROCEDURE — 87880 STREP A ASSAY W/OPTIC: CPT | Performed by: NURSE PRACTITIONER

## 2019-10-26 PROCEDURE — 80053 COMPREHEN METABOLIC PANEL: CPT | Performed by: FAMILY MEDICINE

## 2019-10-26 PROCEDURE — 83930 ASSAY OF BLOOD OSMOLALITY: CPT | Performed by: FAMILY MEDICINE

## 2019-10-26 PROCEDURE — 99222 1ST HOSP IP/OBS MODERATE 55: CPT | Performed by: FAMILY MEDICINE

## 2019-10-26 PROCEDURE — 82962 GLUCOSE BLOOD TEST: CPT

## 2019-10-26 PROCEDURE — 83690 ASSAY OF LIPASE: CPT | Performed by: NURSE PRACTITIONER

## 2019-10-26 PROCEDURE — 36600 WITHDRAWAL OF ARTERIAL BLOOD: CPT

## 2019-10-26 PROCEDURE — 25810000003 SODIUM CHLORIDE 0.9 % WITH KCL 20 MEQ 20-0.9 MEQ/L-% SOLUTION: Performed by: FAMILY MEDICINE

## 2019-10-26 PROCEDURE — 63710000001 INSULIN REGULAR HUMAN PER 5 UNITS: Performed by: NURSE PRACTITIONER

## 2019-10-26 PROCEDURE — 99284 EMERGENCY DEPT VISIT MOD MDM: CPT

## 2019-10-26 PROCEDURE — 81003 URINALYSIS AUTO W/O SCOPE: CPT | Performed by: NURSE PRACTITIONER

## 2019-10-26 RX ORDER — SODIUM CHLORIDE 0.9 % (FLUSH) 0.9 %
10 SYRINGE (ML) INJECTION AS NEEDED
Status: DISCONTINUED | OUTPATIENT
Start: 2019-10-26 | End: 2019-10-27 | Stop reason: HOSPADM

## 2019-10-26 RX ORDER — POTASSIUM CHLORIDE 7.45 MG/ML
10 INJECTION INTRAVENOUS
Status: DISCONTINUED | OUTPATIENT
Start: 2019-10-26 | End: 2019-10-27

## 2019-10-26 RX ORDER — SODIUM CHLORIDE AND POTASSIUM CHLORIDE 150; 900 MG/100ML; MG/100ML
250 INJECTION, SOLUTION INTRAVENOUS CONTINUOUS PRN
Status: DISCONTINUED | OUTPATIENT
Start: 2019-10-26 | End: 2019-10-27

## 2019-10-26 RX ORDER — DEXTROSE, SODIUM CHLORIDE, AND POTASSIUM CHLORIDE 5; .45; .15 G/100ML; G/100ML; G/100ML
250 INJECTION INTRAVENOUS CONTINUOUS PRN
Status: DISCONTINUED | OUTPATIENT
Start: 2019-10-26 | End: 2019-10-27

## 2019-10-26 RX ORDER — NICOTINE POLACRILEX 4 MG
15 LOZENGE BUCCAL
Status: DISCONTINUED | OUTPATIENT
Start: 2019-10-26 | End: 2019-10-27 | Stop reason: HOSPADM

## 2019-10-26 RX ORDER — SODIUM CHLORIDE 9 MG/ML
10 INJECTION, SOLUTION INTRAVENOUS CONTINUOUS PRN
Status: DISCONTINUED | OUTPATIENT
Start: 2019-10-26 | End: 2019-10-27

## 2019-10-26 RX ORDER — DEXTROSE MONOHYDRATE 25 G/50ML
12.5 INJECTION, SOLUTION INTRAVENOUS AS NEEDED
Status: DISCONTINUED | OUTPATIENT
Start: 2019-10-26 | End: 2019-10-27 | Stop reason: HOSPADM

## 2019-10-26 RX ORDER — LOSARTAN POTASSIUM 50 MG/1
50 TABLET ORAL DAILY
COMMUNITY
End: 2019-10-27 | Stop reason: HOSPADM

## 2019-10-26 RX ORDER — SODIUM CHLORIDE 0.9 % (FLUSH) 0.9 %
10 SYRINGE (ML) INJECTION ONCE AS NEEDED
Status: DISCONTINUED | OUTPATIENT
Start: 2019-10-26 | End: 2019-10-27 | Stop reason: HOSPADM

## 2019-10-26 RX ORDER — PROMETHAZINE HYDROCHLORIDE 25 MG/ML
12.5 INJECTION, SOLUTION INTRAMUSCULAR; INTRAVENOUS ONCE
Status: COMPLETED | OUTPATIENT
Start: 2019-10-26 | End: 2019-10-26

## 2019-10-26 RX ORDER — LISINOPRIL 20 MG/1
20 TABLET ORAL DAILY
COMMUNITY

## 2019-10-26 RX ORDER — MAGNESIUM SULFATE HEPTAHYDRATE 40 MG/ML
2 INJECTION, SOLUTION INTRAVENOUS AS NEEDED
Status: DISCONTINUED | OUTPATIENT
Start: 2019-10-26 | End: 2019-10-27

## 2019-10-26 RX ORDER — SODIUM CHLORIDE AND POTASSIUM CHLORIDE 150; 450 MG/100ML; MG/100ML
250 INJECTION, SOLUTION INTRAVENOUS CONTINUOUS PRN
Status: DISCONTINUED | OUTPATIENT
Start: 2019-10-26 | End: 2019-10-27

## 2019-10-26 RX ORDER — DEXTROSE AND SODIUM CHLORIDE 5; .45 G/100ML; G/100ML
250 INJECTION, SOLUTION INTRAVENOUS CONTINUOUS PRN
Status: DISCONTINUED | OUTPATIENT
Start: 2019-10-26 | End: 2019-10-27

## 2019-10-26 RX ORDER — DEXTROSE MONOHYDRATE 25 G/50ML
25 INJECTION, SOLUTION INTRAVENOUS
Status: DISCONTINUED | OUTPATIENT
Start: 2019-10-26 | End: 2019-10-27 | Stop reason: HOSPADM

## 2019-10-26 RX ORDER — SODIUM CHLORIDE 450 MG/100ML
125 INJECTION, SOLUTION INTRAVENOUS CONTINUOUS PRN
Status: DISCONTINUED | OUTPATIENT
Start: 2019-10-26 | End: 2019-10-27

## 2019-10-26 RX ORDER — MAGNESIUM SULFATE 1 G/100ML
1 INJECTION INTRAVENOUS AS NEEDED
Status: DISCONTINUED | OUTPATIENT
Start: 2019-10-26 | End: 2019-10-27

## 2019-10-26 RX ORDER — SODIUM CHLORIDE 0.9 % (FLUSH) 0.9 %
10 SYRINGE (ML) INJECTION EVERY 12 HOURS SCHEDULED
Status: DISCONTINUED | OUTPATIENT
Start: 2019-10-26 | End: 2019-10-27 | Stop reason: HOSPADM

## 2019-10-26 RX ORDER — DEXTROSE, SODIUM CHLORIDE, AND POTASSIUM CHLORIDE 5; .45; .3 G/100ML; G/100ML; G/100ML
250 INJECTION INTRAVENOUS CONTINUOUS PRN
Status: DISCONTINUED | OUTPATIENT
Start: 2019-10-26 | End: 2019-10-27

## 2019-10-26 RX ORDER — SODIUM CHLORIDE 9 MG/ML
250 INJECTION, SOLUTION INTRAVENOUS CONTINUOUS PRN
Status: DISCONTINUED | OUTPATIENT
Start: 2019-10-26 | End: 2019-10-27

## 2019-10-26 RX ORDER — SODIUM CHLORIDE AND POTASSIUM CHLORIDE 300; 900 MG/100ML; MG/100ML
250 INJECTION, SOLUTION INTRAVENOUS CONTINUOUS PRN
Status: DISCONTINUED | OUTPATIENT
Start: 2019-10-26 | End: 2019-10-27

## 2019-10-26 RX ADMIN — PROMETHAZINE HYDROCHLORIDE 12.5 MG: 25 INJECTION INTRAMUSCULAR; INTRAVENOUS at 09:51

## 2019-10-26 RX ADMIN — INSULIN HUMAN 8 UNITS: 100 INJECTION, SOLUTION PARENTERAL at 10:46

## 2019-10-26 RX ADMIN — INSULIN DETEMIR 100 UNITS: 100 INJECTION, SOLUTION SUBCUTANEOUS at 19:55

## 2019-10-26 RX ADMIN — PHENOL 2 SPRAY: 1.5 LIQUID ORAL at 14:24

## 2019-10-26 RX ADMIN — POTASSIUM CHLORIDE, DEXTROSE MONOHYDRATE AND SODIUM CHLORIDE 250 ML/HR: 150; 5; 450 INJECTION, SOLUTION INTRAVENOUS at 23:02

## 2019-10-26 RX ADMIN — POTASSIUM CHLORIDE, DEXTROSE MONOHYDRATE AND SODIUM CHLORIDE 250 ML/HR: 150; 5; 450 INJECTION, SOLUTION INTRAVENOUS at 18:49

## 2019-10-26 RX ADMIN — SODIUM CHLORIDE, PRESERVATIVE FREE 10 ML: 5 INJECTION INTRAVENOUS at 20:00

## 2019-10-26 RX ADMIN — POTASSIUM CHLORIDE AND SODIUM CHLORIDE 250 ML/HR: 900; 150 INJECTION, SOLUTION INTRAVENOUS at 13:26

## 2019-10-26 RX ADMIN — SODIUM CHLORIDE 2000 ML: 9 INJECTION, SOLUTION INTRAVENOUS at 09:50

## 2019-10-26 RX ADMIN — SODIUM CHLORIDE 8 UNITS/HR: 9 INJECTION, SOLUTION INTRAVENOUS at 13:21

## 2019-10-26 RX ADMIN — SODIUM PHOSPHATE, MONOBASIC, MONOHYDRATE 9 MMOL: 276; 142 INJECTION, SOLUTION INTRAVENOUS at 19:39

## 2019-10-27 VITALS
WEIGHT: 183.86 LBS | RESPIRATION RATE: 16 BRPM | OXYGEN SATURATION: 97 % | SYSTOLIC BLOOD PRESSURE: 121 MMHG | BODY MASS INDEX: 25.74 KG/M2 | TEMPERATURE: 98.6 F | DIASTOLIC BLOOD PRESSURE: 53 MMHG | HEIGHT: 71 IN | HEART RATE: 52 BPM

## 2019-10-27 LAB
ANION GAP SERPL CALCULATED.3IONS-SCNC: 11 MMOL/L (ref 5–15)
ANION GAP SERPL CALCULATED.3IONS-SCNC: 12 MMOL/L (ref 5–15)
ANION GAP SERPL CALCULATED.3IONS-SCNC: 12 MMOL/L (ref 5–15)
BUN BLD-MCNC: 19 MG/DL (ref 6–20)
BUN BLD-MCNC: 21 MG/DL (ref 6–20)
BUN BLD-MCNC: 23 MG/DL (ref 6–20)
BUN/CREAT SERPL: 15.8 (ref 7–25)
BUN/CREAT SERPL: 16.2 (ref 7–25)
BUN/CREAT SERPL: 16.4 (ref 7–25)
CALCIUM SPEC-SCNC: 8.3 MG/DL (ref 8.6–10.5)
CALCIUM SPEC-SCNC: 8.3 MG/DL (ref 8.6–10.5)
CALCIUM SPEC-SCNC: 8.9 MG/DL (ref 8.6–10.5)
CHLORIDE SERPL-SCNC: 103 MMOL/L (ref 98–107)
CHLORIDE SERPL-SCNC: 103 MMOL/L (ref 98–107)
CHLORIDE SERPL-SCNC: 105 MMOL/L (ref 98–107)
CO2 SERPL-SCNC: 23 MMOL/L (ref 22–29)
CO2 SERPL-SCNC: 23 MMOL/L (ref 22–29)
CO2 SERPL-SCNC: 24 MMOL/L (ref 22–29)
CREAT BLD-MCNC: 1.2 MG/DL (ref 0.76–1.27)
CREAT BLD-MCNC: 1.3 MG/DL (ref 0.76–1.27)
CREAT BLD-MCNC: 1.4 MG/DL (ref 0.76–1.27)
GFR SERPL CREATININE-BSD FRML MDRD: 60 ML/MIN/1.73
GFR SERPL CREATININE-BSD FRML MDRD: 66 ML/MIN/1.73
GFR SERPL CREATININE-BSD FRML MDRD: 72 ML/MIN/1.73
GLUCOSE BLD-MCNC: 137 MG/DL (ref 65–99)
GLUCOSE BLD-MCNC: 200 MG/DL (ref 65–99)
GLUCOSE BLD-MCNC: 90 MG/DL (ref 65–99)
GLUCOSE BLDC GLUCOMTR-MCNC: 122 MG/DL (ref 70–130)
GLUCOSE BLDC GLUCOMTR-MCNC: 152 MG/DL (ref 70–130)
GLUCOSE BLDC GLUCOMTR-MCNC: 84 MG/DL (ref 70–130)
GLUCOSE BLDC GLUCOMTR-MCNC: 97 MG/DL (ref 70–130)
MAGNESIUM SERPL-MCNC: 1.8 MG/DL (ref 1.6–2.6)
MAGNESIUM SERPL-MCNC: 1.9 MG/DL (ref 1.6–2.6)
MAGNESIUM SERPL-MCNC: 2 MG/DL (ref 1.6–2.6)
PHOSPHATE SERPL-MCNC: 2.5 MG/DL (ref 2.5–4.5)
PHOSPHATE SERPL-MCNC: 2.5 MG/DL (ref 2.5–4.5)
PHOSPHATE SERPL-MCNC: 2.7 MG/DL (ref 2.5–4.5)
POTASSIUM BLD-SCNC: 3.9 MMOL/L (ref 3.5–5.2)
POTASSIUM BLD-SCNC: 3.9 MMOL/L (ref 3.5–5.2)
POTASSIUM BLD-SCNC: 4.1 MMOL/L (ref 3.5–5.2)
SODIUM BLD-SCNC: 138 MMOL/L (ref 136–145)
SODIUM BLD-SCNC: 139 MMOL/L (ref 136–145)
SODIUM BLD-SCNC: 139 MMOL/L (ref 136–145)

## 2019-10-27 PROCEDURE — 80048 BASIC METABOLIC PNL TOTAL CA: CPT | Performed by: FAMILY MEDICINE

## 2019-10-27 PROCEDURE — 83735 ASSAY OF MAGNESIUM: CPT | Performed by: FAMILY MEDICINE

## 2019-10-27 PROCEDURE — 90674 CCIIV4 VAC NO PRSV 0.5 ML IM: CPT | Performed by: FAMILY MEDICINE

## 2019-10-27 PROCEDURE — 84100 ASSAY OF PHOSPHORUS: CPT | Performed by: FAMILY MEDICINE

## 2019-10-27 PROCEDURE — 25010000002 INFLUENZA VAC SUBUNIT QUAD 0.5 ML SUSPENSION PREFILLED SYRINGE: Performed by: FAMILY MEDICINE

## 2019-10-27 PROCEDURE — 82962 GLUCOSE BLOOD TEST: CPT

## 2019-10-27 PROCEDURE — G0008 ADMIN INFLUENZA VIRUS VAC: HCPCS | Performed by: FAMILY MEDICINE

## 2019-10-27 PROCEDURE — 63710000001 INSULIN LISPRO (HUMAN) PER 5 UNITS: Performed by: FAMILY MEDICINE

## 2019-10-27 PROCEDURE — 99238 HOSP IP/OBS DSCHRG MGMT 30/<: CPT | Performed by: FAMILY MEDICINE

## 2019-10-27 RX ADMIN — SODIUM CHLORIDE 250 ML/HR: 4.5 INJECTION, SOLUTION INTRAVENOUS at 02:49

## 2019-10-27 RX ADMIN — INSULIN LISPRO 8 UNITS: 100 INJECTION, SOLUTION INTRAVENOUS; SUBCUTANEOUS at 12:09

## 2019-10-27 RX ADMIN — INSULIN LISPRO 5 UNITS: 100 INJECTION, SOLUTION INTRAVENOUS; SUBCUTANEOUS at 17:11

## 2019-10-27 RX ADMIN — SODIUM CHLORIDE 250 ML/HR: 4.5 INJECTION, SOLUTION INTRAVENOUS at 07:09

## 2019-10-27 RX ADMIN — INFLUENZA A VIRUS A/IDAHO/07/2018 (H1N1) ANTIGEN (MDCK CELL DERIVED, PROPIOLACTONE INACTIVATED, INFLUENZA A VIRUS A/INDIANA/08/2018 (H3N2) ANTIGEN (MDCK CELL DERIVED, PROPIOLACTONE INACTIVATED), INFLUENZA B VIRUS B/SINGAPORE/INFTT-16-0610/2016 ANTIGEN (MDCK CELL DERIVED, PROPIOLACTONE INACTIVATED), INFLUENZA B VIRUS B/IOWA/06/2017 ANTIGEN (MDCK CELL DERIVED, PROPIOLACTONE INACTIVATED) 0.5 ML: 15; 15; 15; 15 INJECTION, SUSPENSION INTRAMUSCULAR at 14:44

## 2019-10-27 RX ADMIN — SODIUM CHLORIDE, PRESERVATIVE FREE 10 ML: 5 INJECTION INTRAVENOUS at 08:01

## 2019-10-28 LAB — BACTERIA SPEC AEROBE CULT: NORMAL

## 2019-10-30 PROBLEM — Z01.89 LABORATORY TEST: Status: ACTIVE | Noted: 2019-10-30

## 2019-10-31 ENCOUNTER — OFFICE VISIT (OUTPATIENT)
Dept: FAMILY MEDICINE CLINIC | Facility: CLINIC | Age: 29
End: 2019-10-31

## 2019-10-31 VITALS
HEIGHT: 71 IN | RESPIRATION RATE: 16 BRPM | SYSTOLIC BLOOD PRESSURE: 116 MMHG | OXYGEN SATURATION: 100 % | HEART RATE: 88 BPM | WEIGHT: 196 LBS | DIASTOLIC BLOOD PRESSURE: 84 MMHG | TEMPERATURE: 98.8 F | BODY MASS INDEX: 27.44 KG/M2

## 2019-10-31 DIAGNOSIS — N17.9 ACUTE RENAL INJURY (HCC): ICD-10-CM

## 2019-10-31 DIAGNOSIS — I10 ESSENTIAL HYPERTENSION: Chronic | ICD-10-CM

## 2019-10-31 DIAGNOSIS — R11.2 NAUSEA AND VOMITING, INTRACTABILITY OF VOMITING NOT SPECIFIED, UNSPECIFIED VOMITING TYPE: ICD-10-CM

## 2019-10-31 DIAGNOSIS — R73.9 HYPERGLYCEMIA: ICD-10-CM

## 2019-10-31 DIAGNOSIS — E10.65 UNCONTROLLED TYPE 1 DIABETES MELLITUS WITH HYPERGLYCEMIA (HCC): Chronic | ICD-10-CM

## 2019-10-31 DIAGNOSIS — E10.10 DIABETIC KETOACIDOSIS WITHOUT COMA ASSOCIATED WITH TYPE 1 DIABETES MELLITUS (HCC): ICD-10-CM

## 2019-10-31 DIAGNOSIS — I95.9 HYPOTENSION, UNSPECIFIED HYPOTENSION TYPE: ICD-10-CM

## 2019-10-31 DIAGNOSIS — Z79.899 ENCOUNTER FOR MEDICATION REVIEW: ICD-10-CM

## 2019-10-31 PROCEDURE — 99214 OFFICE O/P EST MOD 30 MIN: CPT | Performed by: FAMILY MEDICINE

## 2019-10-31 NOTE — PROGRESS NOTES
Enrique Rodríguez II is a 29 y.o. male who presents for a transitional care management visit. Alone***.   Within 48 business hours after discharge our office contacted him via telephone to coordinate his care and needs.   I reviewed and discussed the details of that call along with the discharge summary, hospital problems, inpatient lab results, inpatient diagnostic studies, and consultation reports with Enrique.   Current outpatient and discharge medications have been reconciled for the patient.   Admit Date: 10/26/2019   Discharge Date: 10.27.19   DKA   Hypotension (with hypertension/Rx)   Hyperglycemia: 573   Metabolic acidosis 7.298   Lactic acid elevation   hypophosphoratemia 1.8   Dehydration   Acute renal insufficiency-injury   Nausea with vomiting   leukocytosis   Uncontrolled DM1   Risk for Readmission (LACE) Score: 5 (10/27/2019 5:00 AM)     History of Present Illness   History of long term DM1; age 10. Was for awhile followed by [facility] (yanelis that left [facility]). Not seen our office since 11.2017 (followed various other places). Exposed to his daughter who had brief nausea and vomiting. Approximately 4 days later some/yesterday he suddenly developed nausea and vomiting. He called the office and wanted Zofran's. It was late on the day and we offered him a office visit versus keeping the appointment that he had already made for next week (to reestablish care. We did suggest he come to the ER if he got any worse. He tried the Zofran and did not work. He developed increasing fatigue lethargy and presented to the ER where he was felt to be in DKA. A1c was noted at 10.5 magnesium 3 phosphorus 5.5 acetone small white count 24 hemoglobin 15, left shift of 87% neutrophils lactic acid 2.1 lipase 66 glucose 573 BUN 51 creatinine 3.5, with calculated GFR 21 and bicarb 15; ABGs with pH 7.28 PCO2 22 and a -13 base excess. He had no sore throat rhinorrhea and his strep a antigen was negative. He had no dysuria and his  urinalysis was normal except for glucose greater than thousand and 80 Milligrams per deciliter ketones. His chest x-ray was normal and he had no cough. He was admitted for DKA with aggressive fluids and insulin administration. Dr. De La Cruz was covering allow admission.   Course During Hospital Stay:   He received aggressive fluids and other modalities from the DKA protocol; he was watched very closely with very frequent labs and he clinically and laboratory wise slowly improved. And his diet was advanced and tolerated there is no vomiting or diarrhea. There were no specimens as he had no diarrhea to test. We slowly wean off his IV insulin and other supports to allow him to resume his normal diabetic management; this was monitored long enough to seem that it was quite stable and we elected to follow him as an outpatient.   {Common H&P Review Areas:18741}   Review of Systems   GENERAL: Active work/home and some weights. Sleep is ok; apnea denied. No fever now.   ENDO: No syncope, near or diaphoretic sweaty spells. BS Ok: without download noted.   HEENT: No head injury occ/same headache, No vision change, No significant hearing loss. Ears without pain/drainage. No sore throat. No significant nasal/sinus congestion/drainage. No epistaxis.   CHEST: No chest wall tenderness or mass. No cough, without wheeze. No SOB; no hemoptysis.   CV: No chest pain, palpitations, ankle edema.   GI: No heartburn, dysphagia. No abdominal pain, diarrhea, constipation. No rectal bleeding, or melena.   : Voids without dysuria, or incontinence to completion.   ORTHO: No painful/swollen joints but various on /off sore. No sore neck or back. No acute neck or back pain without recent injury.   NEURO: No dizziness, weakness of extremities. No numbness/paresthesias.   PSYCH: No memory loss. Mood good; not that anxious, depressed but/and not suicidal. Tries to tolerate stress .   Screening:   Mammogram: NA   Bone density: NA   Low dose CT chest: NA    GI: NA   Prostate: NA   Usual Lab:   6m BMP, A1c   12m CBC, CMP, A1c, LIPID         Results for orders placed or performed during the hospital encounter of 10/26/19   Rapid Strep A Screen - Swab, Throat   Result Value Ref Range    Strep A Ag Negative Negative   Beta Strep Culture, Throat - Swab, Throat   Result Value Ref Range    Throat Culture, Beta Strep No Beta Hemolytic Streptococcus Isolated    Comprehensive Metabolic Panel   Result Value Ref Range    Glucose 573 (C) 65 - 99 mg/dL    BUN 51 (H) 6 - 20 mg/dL    Creatinine 3.50 (H) 0.76 - 1.27 mg/dL    Sodium 133 (L) 136 - 145 mmol/L    Potassium 5.3 (H) 3.5 - 5.2 mmol/L    Chloride 85 (L) 98 - 107 mmol/L    CO2 15.0 (L) 22.0 - 29.0 mmol/L    Calcium 9.4 8.6 - 10.5 mg/dL    Total Protein 7.9 6.0 - 8.5 g/dL    Albumin 4.80 3.50 - 5.20 g/dL    ALT (SGPT) 23 1 - 41 U/L    AST (SGOT) 13 1 - 40 U/L    Alkaline Phosphatase 113 39 - 117 U/L    Total Bilirubin 0.5 0.2 - 1.2 mg/dL    eGFR Non African Amer 21 (L) >60 mL/min/1.73    Globulin 3.1 gm/dL    A/G Ratio 1.5 g/dL    BUN/Creatinine Ratio 14.6 7.0 - 25.0    Anion Gap 33.0 (H) 5.0 - 15.0 mmol/L   Lipase   Result Value Ref Range    Lipase 66 (H) 13 - 60 U/L   Urinalysis With Culture If Indicated - Urine, Clean Catch   Result Value Ref Range    Color, UA Yellow Yellow, Straw    Appearance, UA Clear Clear    pH, UA <=5.0 5.0 - 8.0    Specific Gravity, UA 1.024 1.005 - 1.030    Glucose, UA >=1000 mg/dL (3+) (A) Negative    Ketones, UA 80 mg/dL (3+) (A) Negative    Bilirubin, UA Negative Negative    Blood, UA Negative Negative    Protein, UA Trace (A) Negative    Leuk Esterase, UA Negative Negative    Nitrite, UA Negative Negative    Urobilinogen, UA 0.2 E.U./dL 0.2 - 1.0 E.U./dL   Lactic Acid, Plasma   Result Value Ref Range    Lactate 2.1 (C) 0.5 - 2.0 mmol/L   CBC Auto Differential   Result Value Ref Range    WBC 24.11 (H) 3.40 - 10.80 10*3/mm3    RBC 5.24 4.14 - 5.80 10*6/mm3    Hemoglobin 15.0 13.0 - 17.7 g/dL     Hematocrit 44.7 37.5 - 51.0 %    MCV 85.3 79.0 - 97.0 fL    MCH 28.6 26.6 - 33.0 pg    MCHC 33.6 31.5 - 35.7 g/dL    RDW 12.3 12.3 - 15.4 %    RDW-SD 37.9 37.0 - 54.0 fl    MPV 10.6 6.0 - 12.0 fL    Platelets 305 140 - 450 10*3/mm3    Neutrophil % 87.7 (H) 42.7 - 76.0 %    Lymphocyte % 6.3 (L) 19.6 - 45.3 %    Monocyte % 5.1 5.0 - 12.0 %    Eosinophil % 0.0 (L) 0.3 - 6.2 %    Basophil % 0.2 0.0 - 1.5 %    Immature Grans % 0.7 (H) 0.0 - 0.5 %    Neutrophils, Absolute 21.15 (H) 1.70 - 7.00 10*3/mm3    Lymphocytes, Absolute 1.52 0.70 - 3.10 10*3/mm3    Monocytes, Absolute 1.24 (H) 0.10 - 0.90 10*3/mm3    Eosinophils, Absolute 0.00 0.00 - 0.40 10*3/mm3    Basophils, Absolute 0.04 0.00 - 0.20 10*3/mm3    Immature Grans, Absolute 0.16 (H) 0.00 - 0.05 10*3/mm3    nRBC 0.0 0.0 - 0.2 /100 WBC   Acetone   Result Value Ref Range    Acetone Small (A) Negative   Blood Gas, Arterial   Result Value Ref Range    Site Left Brachial     Jaswinder's Test N/A     pH, Arterial 7.298 (L) 7.350 - 7.450 pH units    pCO2, Arterial 22.9 (L) 35.0 - 45.0 mm Hg    pO2, Arterial 79.2 (L) 83.0 - 108.0 mm Hg    HCO3, Arterial 11.2 (L) 20.0 - 26.0 mmol/L    Base Excess, Arterial -13.1 (L) 0.0 - 2.0 mmol/L    O2 Saturation, Arterial 95.6 94.0 - 99.0 %    Temperature 37.0 C    Barometric Pressure for Blood Gas 742 mmHg    Modality Room Air     Ventilator Mode NA     Collected by [id number]    Lactic Acid, Reflex Timer (This will reflex a repeat order 3-3:15 hours after ordered.)   Result Value Ref Range    Extra Tube Hold for add-ons.    Comprehensive Metabolic Panel   Result Value Ref Range    Glucose 436 (C) 65 - 99 mg/dL    BUN 48 (H) 6 - 20 mg/dL    Creatinine 2.98 (H) 0.76 - 1.27 mg/dL    Sodium 134 (L) 136 - 145 mmol/L    Potassium 5.7 (H) 3.5 - 5.2 mmol/L    Chloride 94 (L) 98 - 107 mmol/L    CO2 12.0 (L) 22.0 - 29.0 mmol/L    Calcium 9.1 8.6 - 10.5 mg/dL    Total Protein 7.1 6.0 - 8.5 g/dL    Albumin 4.60 3.50 - 5.20 g/dL    ALT (SGPT) 19 1 - 41 U/L      AST (SGOT) 12 1 - 40 U/L    Alkaline Phosphatase 106 39 - 117 U/L    Total Bilirubin 0.5 0.2 - 1.2 mg/dL    eGFR Non African Amer 25 (L) >60 mL/min/1.73    Globulin 2.5 gm/dL    A/G Ratio 1.8 g/dL    BUN/Creatinine Ratio 16.1 7.0 - 25.0    Anion Gap 28.0 (H) 5.0 - 15.0 mmol/L   Phosphorus   Result Value Ref Range    Phosphorus 5.5 (H) 2.5 - 4.5 mg/dL   Magnesium   Result Value Ref Range    Magnesium 3.0 (H) 1.6 - 2.6 mg/dL   Osmolality, Serum   Result Value Ref Range    Osmolality 324 (H) 289 - 308 mOsm/kg   Hemoglobin A1c   Result Value Ref Range    Hemoglobin A1C 10.50 (H) 4.80 - 5.60 %   Magnesium   Result Value Ref Range    Magnesium 2.7 (H) 1.6 - 2.6 mg/dL   Phosphorus   Result Value Ref Range    Phosphorus 3.9 2.5 - 4.5 mg/dL   CBC Auto Differential   Result Value Ref Range    WBC 22.01 (H) 3.40 - 10.80 10*3/mm3    RBC 5.00 4.14 - 5.80 10*6/mm3    Hemoglobin 14.5 13.0 - 17.7 g/dL    Hematocrit 42.4 37.5 - 51.0 %    MCV 84.8 79.0 - 97.0 fL    MCH 29.0 26.6 - 33.0 pg    MCHC 34.2 31.5 - 35.7 g/dL    RDW 12.5 12.3 - 15.4 %    RDW-SD 38.1 37.0 - 54.0 fl    MPV 10.3 6.0 - 12.0 fL    Platelets 262 140 - 450 10*3/mm3    Neutrophil % 81.7 (H) 42.7 - 76.0 %    Lymphocyte % 10.1 (L) 19.6 - 45.3 %    Monocyte % 7.4 5.0 - 12.0 %    Eosinophil % 0.0 (L) 0.3 - 6.2 %    Basophil % 0.1 0.0 - 1.5 %    Immature Grans % 0.7 (H) 0.0 - 0.5 %    Neutrophils, Absolute 17.99 (H) 1.70 - 7.00 10*3/mm3    Lymphocytes, Absolute 2.22 0.70 - 3.10 10*3/mm3    Monocytes, Absolute 1.62 (H) 0.10 - 0.90 10*3/mm3    Eosinophils, Absolute 0.00 0.00 - 0.40 10*3/mm3    Basophils, Absolute 0.03 0.00 - 0.20 10*3/mm3    Immature Grans, Absolute 0.15 (H) 0.00 - 0.05 10*3/mm3    nRBC 0.0 0.0 - 0.2 /100 WBC   Lactic Acid, Reflex   Result Value Ref Range    Lactate 0.9 0.5 - 2.0 mmol/L   Blood Gas, Venous   Result Value Ref Range    Site OTHER     pH, Venous 7.300 (L) 7.320 - 7.420 pH Units    pCO2, Venous 21.5 (L) 41.0 - 51.0 mm Hg    pO2, Venous  106.0 (H) 27.0 - 53.0 mm Hg    HCO3, Venous 10.6 (L) 22.0 - 28.0 mmol/L    Base Excess, Venous -13.6 (L) 0.0 - 2.0 mmol/L    O2 Saturation, Venous 98.0 (H) 45.0 - 75.0 %    Temperature 37.0 C    Barometric Pressure for Blood Gas 736 mmHg    Modality Room Air     FIO2 21 %    Ventilator Mode NA     Collected by [id number]    Basic Metabolic Panel   Result Value Ref Range    Glucose 124 (H) 65 - 99 mg/dL    BUN 42 (H) 6 - 20 mg/dL    Creatinine 2.33 (H) 0.76 - 1.27 mg/dL    Sodium 141 136 - 145 mmol/L    Potassium 4.2 3.5 - 5.2 mmol/L    Chloride 105 98 - 107 mmol/L    CO2 21.0 (L) 22.0 - 29.0 mmol/L    Calcium 9.2 8.6 - 10.5 mg/dL    eGFR Non African Amer 34 (L) >60 mL/min/1.73    BUN/Creatinine Ratio 18.0 7.0 - 25.0    Anion Gap 15.0 5.0 - 15.0 mmol/L   Basic Metabolic Panel   Result Value Ref Range    Glucose 131 (H) 65 - 99 mg/dL    BUN 36 (H) 6 - 20 mg/dL    Creatinine 2.07 (H) 0.76 - 1.27 mg/dL    Sodium 142 136 - 145 mmol/L    Potassium 4.6 3.5 - 5.2 mmol/L    Chloride 104 98 - 107 mmol/L    CO2 20.0 (L) 22.0 - 29.0 mmol/L    Calcium 8.9 8.6 - 10.5 mg/dL    eGFR Non African Amer 38 (L) >60 mL/min/1.73    BUN/Creatinine Ratio 17.4 7.0 - 25.0    Anion Gap 18.0 (H) 5.0 - 15.0 mmol/L   Magnesium   Result Value Ref Range    Magnesium 2.6 1.6 - 2.6 mg/dL   Magnesium   Result Value Ref Range    Magnesium 2.4 1.6 - 2.6 mg/dL   Phosphorus   Result Value Ref Range    Phosphorus 1.8 (C) 2.5 - 4.5 mg/dL   Phosphorus   Result Value Ref Range    Phosphorus 2.8 2.5 - 4.5 mg/dL   Blood Gas, Venous   Result Value Ref Range    Site OTHER     pH, Venous 7.349 7.320 - 7.420 pH Units    pCO2, Venous 39.0 (L) 41.0 - 51.0 mm Hg    pO2, Venous 29.7 27.0 - 53.0 mm Hg    HCO3, Venous 21.5 (L) 22.0 - 28.0 mmol/L    Base Excess, Venous -3.8 (L) 0.0 - 2.0 mmol/L    O2 Saturation, Venous 57.6 45.0 - 75.0 %    Temperature 37.0 C    Barometric Pressure for Blood Gas 740 mmHg    Modality Room Air     FIO2 21 %    Ventilator Mode NA     Collected  by [id number]    Basic Metabolic Panel   Result Value Ref Range    Glucose 220 (H) 65 - 99 mg/dL    BUN 29 (H) 6 - 20 mg/dL    Creatinine 1.62 (H) 0.76 - 1.27 mg/dL    Sodium 138 136 - 145 mmol/L    Potassium 4.7 3.5 - 5.2 mmol/L    Chloride 102 98 - 107 mmol/L    CO2 19.0 (L) 22.0 - 29.0 mmol/L    Calcium 8.2 (L) 8.6 - 10.5 mg/dL    eGFR Non African Amer 51 (L) >60 mL/min/1.73    BUN/Creatinine Ratio 17.9 7.0 - 25.0    Anion Gap 17.0 (H) 5.0 - 15.0 mmol/L   Magnesium   Result Value Ref Range    Magnesium 2.1 1.6 - 2.6 mg/dL   Phosphorus   Result Value Ref Range    Phosphorus 2.5 2.5 - 4.5 mg/dL   Basic Metabolic Panel   Result Value Ref Range    Glucose 200 (H) 65 - 99 mg/dL    BUN 23 (H) 6 - 20 mg/dL    Creatinine 1.40 (H) 0.76 - 1.27 mg/dL    Sodium 138 136 - 145 mmol/L    Potassium 4.1 3.5 - 5.2 mmol/L    Chloride 103 98 - 107 mmol/L    CO2 23.0 22.0 - 29.0 mmol/L    Calcium 8.3 (L) 8.6 - 10.5 mg/dL    eGFR Non African Amer 60 (L) >60 mL/min/1.73    BUN/Creatinine Ratio 16.4 7.0 - 25.0    Anion Gap 12.0 5.0 - 15.0 mmol/L   Magnesium   Result Value Ref Range    Magnesium 2.0 1.6 - 2.6 mg/dL   Phosphorus   Result Value Ref Range    Phosphorus 2.5 2.5 - 4.5 mg/dL   Blood Gas, Venous   Result Value Ref Range    Site OTHER     pH, Venous 7.316 (L) 7.320 - 7.420 pH Units    pCO2, Venous 36.6 (L) 41.0 - 51.0 mm Hg    pO2, Venous 49.8 27.0 - 53.0 mm Hg    HCO3, Venous 18.7 (L) 22.0 - 28.0 mmol/L    Base Excess, Venous -6.8 (L) 0.0 - 2.0 mmol/L    O2 Saturation, Venous 84.9 (H) 45.0 - 75.0 %    Temperature 37.0 C    Barometric Pressure for Blood Gas 745 mmHg    Modality Room Air     FIO2 21 %    Ventilator Mode NA     Collected by [id number]    Basic Metabolic Panel   Result Value Ref Range    Glucose 90 65 - 99 mg/dL    BUN 21 (H) 6 - 20 mg/dL    Creatinine 1.30 (H) 0.76 - 1.27 mg/dL    Sodium 139 136 - 145 mmol/L    Potassium 3.9 3.5 - 5.2 mmol/L    Chloride 105 98 - 107 mmol/L    CO2 23.0 22.0 - 29.0 mmol/L    Calcium 8.9 8.6  - 10.5 mg/dL    eGFR Non African Amer 66 >60 mL/min/1.73    BUN/Creatinine Ratio 16.2 7.0 - 25.0    Anion Gap 11.0 5.0 - 15.0 mmol/L   Magnesium   Result Value Ref Range    Magnesium 1.9 1.6 - 2.6 mg/dL   Phosphorus   Result Value Ref Range    Phosphorus 2.7 2.5 - 4.5 mg/dL   Basic Metabolic Panel   Result Value Ref Range    Glucose 137 (H) 65 - 99 mg/dL    BUN 19 6 - 20 mg/dL    Creatinine 1.20 0.76 - 1.27 mg/dL    Sodium 139 136 - 145 mmol/L    Potassium 3.9 3.5 - 5.2 mmol/L    Chloride 103 98 - 107 mmol/L    CO2 24.0 22.0 - 29.0 mmol/L    Calcium 8.3 (L) 8.6 - 10.5 mg/dL    eGFR Non African Amer 72 >60 mL/min/1.73    BUN/Creatinine Ratio 15.8 7.0 - 25.0    Anion Gap 12.0 5.0 - 15.0 mmol/L   Magnesium   Result Value Ref Range    Magnesium 1.8 1.6 - 2.6 mg/dL   Phosphorus   Result Value Ref Range    Phosphorus 2.5 2.5 - 4.5 mg/dL   POC Glucose Once   Result Value Ref Range    Glucose 440 (C) 70 - 130 mg/dL   POC Glucose Once   Result Value Ref Range    Glucose 348 (H) 70 - 130 mg/dL   POC Glucose Once   Result Value Ref Range    Glucose 391 (H) 70 - 130 mg/dL   POC Glucose Once   Result Value Ref Range    Glucose 416 (C) 70 - 130 mg/dL   POC Glucose Once   Result Value Ref Range    Glucose 371 (H) 70 - 130 mg/dL   POC Glucose Once   Result Value Ref Range    Glucose 404 (C) 70 - 130 mg/dL   POC Glucose Once   Result Value Ref Range    Glucose 369 (H) 70 - 130 mg/dL   POC Glucose Once   Result Value Ref Range    Glucose 300 (H) 70 - 130 mg/dL   POC Glucose Once   Result Value Ref Range    Glucose 253 (H) 70 - 130 mg/dL   POC Glucose Once   Result Value Ref Range    Glucose 204 (H) 70 - 130 mg/dL   POC Glucose Once   Result Value Ref Range    Glucose 117 70 - 130 mg/dL   POC Glucose Once   Result Value Ref Range    Glucose 92 70 - 130 mg/dL   POC Glucose Once   Result Value Ref Range    Glucose 113 70 - 130 mg/dL   POC Glucose Once   Result Value Ref Range    Glucose 84 70 - 130 mg/dL   POC Glucose Once   Result  "Value Ref Range    Glucose 122 70 - 130 mg/dL   POC Glucose Once   Result Value Ref Range    Glucose 152 (H) 70 - 130 mg/dL   POC Glucose Once   Result Value Ref Range    Glucose 97 70 - 130 mg/dL   Light Blue Top   Result Value Ref Range    Extra Tube hold for add-on      No results found for: PSA   Lab Results:   CBC:         Lab Results - Last 18 Months   Lab Units 10/26/19   1322 10/26/19   0948   WBC 10*3/mm3 22.01* 24.11*   HEMOGLOBIN g/dL 14.5 15.0   HEMATOCRIT % 42.4 44.7   PLATELETS 10*3/mm3 262 305     BMP/CMP:              Lab Results - Last 18 Months   Lab Units 10/27/19   1116 10/27/19   0731 10/27/19   0425 10/26/19   2325 10/26/19   2003 10/26/19   1805 10/26/19   1322   SODIUM mmol/L 139 139 138 138 142 141 134*   POTASSIUM mmol/L 3.9 3.9 4.1 4.7 4.6 4.2 5.7*   CHLORIDE mmol/L 103 105 103 102 104 105 94*   CO2 mmol/L 24.0 23.0 23.0 19.0* 20.0* 21.0* 12.0*   BUN mg/dL 19 21* 23* 29* 36* 42* 48*   CREATININE mg/dL 1.20 1.30* 1.40* 1.62* 2.07* 2.33* 2.98*   EGFR IF NONAFRICN AM mL/min/1.73 72 66 60* 51* 38* 34* 25*   CALCIUM mg/dL 8.3* 8.9 8.3* 8.2* 8.9 9.2 9.1     HEPATIC:         Lab Results - Last 18 Months   Lab Units 10/26/19   1322 10/26/19   0948   ALT (SGPT) U/L 19 23   AST (SGOT) U/L 12 13   ALK PHOS U/L 106 113     THYROID:No results for input(s): TSH, T3FREE, FREET4, FTI in the last 58086 hours.   Invalid input(s): T3, T4, TEUP   A1C:        Lab Results - Last 18 Months   Lab Units 10/26/19   0948   HEMOGLOBIN A1C % 10.50*     PSA:No results for input(s): PSA in the last 32917 hours.   Objective   There were no vitals taken for this visit.   There is no height or weight on file to calculate BMI.   Physical Exam   {ROEXAMfixed:07476::\"GENERAL: Well nourished/developed in no acute distress. ***\",\"SKIN: Turgor excellent, without wound, rash, lesion other than: PHOTO***\",\"HEENT: Normal cephalic without trauma. Pupils equal round reactive to light. Extraocular motions full without nystagmus. " "External canals nonobstructive nontender without reddness. Tymphatic membranes carmela with megan structures intact. Oral cavity without growths, exudates, and moist. Posterior pharynx without mass, obstruction, redness.*** No thyromegaly, mass, tenderness, lymphadenopathy and supple.***\",\"CV: R***egular rhythm. No ***murmur, ***gallop, *** edema. Posterior pulses intact. No carotid bruits***.\",\"CHEST: No chest wall tenderness or mass. \",\"LUNGS: Symmetric motion with clear*** to auscultation. No dullness to percussion***\",\"ABD: Soft, nontender without mass. \",\"ORTHO: Symmetric extremities without swelling/point tenderness. Full gross range of motion. ***. Wheelchair ***. Walking with assistance ***.\",\"NEURO: CN 2-12 grossly intact. Symmetric facies and UE/LE. ***/5 strength throughout. ***/4 x *** *** equal reflexes.*** Nonfocal use extremities. Speech clear. Reduced***Intact*** light touch with monofilament, vibratory sensation with tuning fork; equal toes/distal feet.*** \",\"PSYCH: Dis***Oriented x 3. Pleasant calm, well kept. Shallow but p***P***urposeful/directed conservation with intact short/long gross memory. \"}   Assessment/Plan   1. Essential hypertension    2. Diabetic ketoacidosis without coma associated with type 1 diabetes mellitus (CMS/HCC)    3. Uncontrolled type 1 diabetes mellitus with hyperglycemia (CMS/HCC)    4. Dehydration    5. Acute renal insufficiency    6. Nausea and vomiting, intractability of vomiting not specified, unspecified vomiting type      Rx: reviewed/changes:   No orders of the defined types were placed in this encounter.     LAB/Testing/Referrals: reviewed/orders:   Today: ***   No orders of the defined types were placed in this encounter.     Usual:   ***   Discussions:   ***   There is no height or weight on file to calculate BMI.   Patient's There is no height or weight on file to calculate BMI. BMI is {BMI range:52683}.   Non-smoker***   Enrique Rodríguez II is a current " "{Tobacco Types:4433351072} user. He currently { AMB SMOKES/CHEWS/USES:3138365265::\"smokes\"} {NUMBER:42963} { AMB TYPES OF TOBACCO:6164179055::\"pack of cigarettes\"} per day for a duration of { AMB NUMBERS:5916036146} {month/year:00882}. I have educated him on the risk of diseases from using tobacco products such as {Tobacco Cessation Diseases:87957::\"cancer\",\"COPD\",\"heart diease\"}.   I advised him to quit and he is {Willing/Not Willing to Quit Tobacco Products:37117}.   I spent {Time Spent Tobacco :89903} minutes counseling the patient.   ***   There are no Patient Instructions on file for this visit.   Follow up: No Follow-up on file.          Future Appointments   Date Time Provider Department Center   10/30/2019 10:45 AM Wyatt Louie MD [facility] None     ***   Current outpatient and discharge medications have been reconciled for the patient.     "

## 2019-11-04 DIAGNOSIS — E10.65 UNCONTROLLED TYPE 1 DIABETES MELLITUS WITH HYPERGLYCEMIA (HCC): Primary | ICD-10-CM

## 2019-11-05 LAB
APPEARANCE UR: CLEAR
BACTERIA #/AREA URNS HPF: NORMAL /HPF
BILIRUB UR QL STRIP: NEGATIVE
COLOR UR: YELLOW
EPI CELLS #/AREA URNS HPF: NORMAL /HPF
GLUCOSE UR QL: NEGATIVE
HGB UR QL STRIP: NEGATIVE
KETONES UR QL STRIP: NEGATIVE
LEUKOCYTE ESTERASE UR QL STRIP: NEGATIVE
MICRO URNS: ABNORMAL
MICRO URNS: ABNORMAL
MICROALBUMIN UR-MCNC: <3 UG/ML
NITRITE UR QL STRIP: NEGATIVE
PH UR STRIP: 8 [PH] (ref 5–7.5)
PROT UR QL STRIP: NEGATIVE
RBC #/AREA URNS HPF: NORMAL /HPF
SP GR UR: 1.01 (ref 1–1.03)
URINALYSIS REFLEX: ABNORMAL
UROBILINOGEN UR STRIP-MCNC: 0.2 MG/DL (ref 0.2–1)
WBC #/AREA URNS HPF: NORMAL /HPF

## 2019-11-06 ENCOUNTER — TELEPHONE (OUTPATIENT)
Dept: FAMILY MEDICINE CLINIC | Facility: CLINIC | Age: 29
End: 2019-11-06

## 2019-11-06 RX ORDER — AZITHROMYCIN 250 MG/1
TABLET, FILM COATED ORAL
Qty: 6 TABLET | Refills: 0 | Status: SHIPPED | OUTPATIENT
Start: 2019-11-06 | End: 2019-12-26 | Stop reason: SDUPTHER

## 2019-11-06 NOTE — TELEPHONE ENCOUNTER
Pt called and has chest congestion with cough and he leaves tomorrow on the boats.  Would like something called in for him.

## 2019-12-26 ENCOUNTER — TELEPHONE (OUTPATIENT)
Dept: FAMILY MEDICINE CLINIC | Facility: CLINIC | Age: 29
End: 2019-12-26

## 2019-12-26 DIAGNOSIS — J06.9 ACUTE URI: Primary | ICD-10-CM

## 2019-12-26 RX ORDER — AZITHROMYCIN 250 MG/1
TABLET, FILM COATED ORAL
Qty: 6 TABLET | Refills: 0 | Status: SHIPPED | OUTPATIENT
Start: 2019-12-26 | End: 2022-01-26

## 2019-12-26 NOTE — TELEPHONE ENCOUNTER
"Vm \"I had my baby girl in sick last week and now I have a head cold and wanted to see if Dr Louie could call me something in for it?\"  "

## 2019-12-26 NOTE — TELEPHONE ENCOUNTER
"Spoke to pt and is unable to come in, denies fever, stated \"same thing I had before, sinus infection\" Zpack to md2  "

## 2020-08-25 LAB — HOLD SPECIMEN: NORMAL

## 2022-01-26 ENCOUNTER — OFFICE VISIT (OUTPATIENT)
Dept: FAMILY MEDICINE CLINIC | Facility: CLINIC | Age: 32
End: 2022-01-26

## 2022-01-26 VITALS
HEIGHT: 69 IN | DIASTOLIC BLOOD PRESSURE: 60 MMHG | BODY MASS INDEX: 28.44 KG/M2 | SYSTOLIC BLOOD PRESSURE: 112 MMHG | TEMPERATURE: 97.7 F | WEIGHT: 192 LBS | OXYGEN SATURATION: 98 % | RESPIRATION RATE: 16 BRPM | HEART RATE: 95 BPM

## 2022-01-26 DIAGNOSIS — Z71.85 VACCINE COUNSELING: ICD-10-CM

## 2022-01-26 DIAGNOSIS — I10 PRIMARY HYPERTENSION: Chronic | ICD-10-CM

## 2022-01-26 DIAGNOSIS — E10.65 UNCONTROLLED TYPE 1 DIABETES MELLITUS WITH HYPERGLYCEMIA: Chronic | ICD-10-CM

## 2022-01-26 DIAGNOSIS — Z02.89 EXAMINATION, PHYSICAL, EMPLOYEE: ICD-10-CM

## 2022-01-26 PROCEDURE — 99214 OFFICE O/P EST MOD 30 MIN: CPT | Performed by: FAMILY MEDICINE

## 2022-01-26 RX ORDER — INSULIN GLARGINE 100 [IU]/ML
70 INJECTION, SOLUTION SUBCUTANEOUS NIGHTLY
COMMUNITY

## 2022-01-26 RX ORDER — INSULIN LISPRO 100 [IU]/ML
INJECTION, SOLUTION INTRAVENOUS; SUBCUTANEOUS
COMMUNITY
Start: 2021-12-30

## 2022-01-26 RX ORDER — CLONIDINE HYDROCHLORIDE 0.1 MG/1
0.1 TABLET, EXTENDED RELEASE ORAL 2 TIMES DAILY
COMMUNITY
Start: 2021-11-22

## 2022-01-26 NOTE — PROGRESS NOTES
Subjective   Enrique Rodríguez II is a 32 y.o. male presenting with chief complaint of:   Chief Complaint   Patient presents with   • Employment Physical   • Diabetes     see Dr Milton       History of Present Illness :  Alone.  Here for primarily Maurice exam.       Has multiple chronic problems to consider that might have a bearing on today's issues; not an interval appointment.       Chronic/acute problems reviewed today:   1. Examination, physical, employee    2. Primary hypertension : Chronic/stable. Stable here past/no recent home blood pressures.  No significant chest pain, SOB, LE edema, orthopnea, near syncope, dizziness/light headness.   Recent Vitals       10/27/2019 10/31/2019 1/26/2022       BP: 121/53 116/84 112/60     Pulse: 52 88 95     Temp: 98.6 °F (37 °C) 98.8 °F (37.1 °C) 97.7 °F (36.5 °C)     Weight: -- 88.9 kg (196 lb) 87.1 kg (192 lb)     BMI (Calculated): -- 27.3 28.3            3. Uncontrolled type 1 diabetes mellitus with hyperglycemia (HCC) : last A1c over 8 (he cannot recall); see endo   4. Vaccine counseling : Chronic/ongoing need to review pro/cons for various vaccinations based on age, health issues.  Needs vaccination today for: see below.        Has an/another acute issue today: none.    The following portions of the patient's history were reviewed and updated as appropriate: allergies, current medications, past family history, past medical history, past social history, past surgical history and problem list.      Current Outpatient Medications:   •  atorvastatin (LIPITOR) 40 MG tablet, Take 1 tablet by mouth Every Night., Disp: , Rfl:   •  cetirizine (zyrTEC) 10 MG tablet, Take 10 mg by mouth Daily As Needed for allergies., Disp: , Rfl:   •  cloNIDine HCl ER 0.1 MG tablet sustained-release 12 hour tablet, Take 0.1 mg by mouth 2 (Two) Times a Day., Disp: , Rfl:   •  HumaLOG KwikPen 100 UNIT/ML solution pen-injector, INJECT AS DIRECTED PER SLIDING SCALE. MAX OF 30 UNITS DAILY, Disp: ,  Rfl:   •  Insulin Glargine (BASAGLAR KWIKPEN) 100 UNIT/ML injection pen, Inject 70 Units under the skin into the appropriate area as directed Every Night., Disp: , Rfl:   •  lisinopril (PRINIVIL,ZESTRIL) 20 MG tablet, Take 20 mg by mouth Daily., Disp: , Rfl:     No problems with medications.    Allergies   Allergen Reactions   • Other      CATS- Sneezing       Review of Systems    GENERAL:  Active home/work. Sleep is ok without apnea. No fever now/recent  SKIN: No rash/skin lesion of concern other than that above if mentioned  ENDO:  No syncope, near or diaphoretic sweaty spells.  BS without lows. .  HEENT: No recent head injury; or headache.  No vision change.  No hearing loss.  Ears without pain/drainage.  No sore throat.  No nasal/sinus congestion/drainage. No epistaxis.  CHEST: No chest wall tenderness or mass. No cough, without wheeze.  No SOB; no hemoptysis.  CV: No exertional chest pain, palpitations, ankle edema.  GI: No dysphagia or heartburn.  No abdominal pain, diarrhea, constipation.  No rectal bleeding, or melena.    :  Voids without dysuria; incontinence to completion.  ORTHO: No painful/swollen joints but various on /off sore.  No sore neck or back.  No acute neck or back pain without recent injury.  NEURO: No focal/significant weakness of extremities. Not dizziness.   No numbness/paresthesias.   PSYCH: No memory loss.  Mood good denies anxious, depressed but/and not suicidal.  Tries to tolerate stress .   Screening:  Mammogram: NA  Bone density: NA  Low dose CT chest: NA  GI: NA  Prostate: NA  Usual lab order  Per endo    Data reviewed:   Recent admit/ER/MD visits: none  Last cardiac testing:   Echo: none      Lab Results:  Results for orders placed or performed in visit on 11/04/19   Urinalysis With Culture If Indicated - Urine, Clean Catch    Specimen: Urine, Clean Catch   Result Value Ref Range    Specific Gravity, UA 1.007 1.005 - 1.030    pH, UA 8.0 (H) 5.0 - 7.5    Color, UA Yellow Yellow     "Appearance, UA Clear Clear    Leukocytes, UA Negative Negative    Protein Negative Negative/Trace    Glucose, UA Negative Negative    Ketones Negative Negative    Blood, UA Negative Negative    Bilirubin, UA Negative Negative    Urobilinogen, UA 0.2 0.2 - 1.0 mg/dL    Nitrite, UA Negative Negative    Microscopic Examination Comment     Microscopic Examination See below:     Urinalysis Reflex Comment    MicroAlbumin, Urine, Random - Urine, Clean Catch    Specimen: Urine, Clean Catch   Result Value Ref Range    Microalbumin, Urine <3.0 Not Estab. ug/mL   Microscopic Examination -   Result Value Ref Range    WBC, UA None seen 0 - 5 /hpf    RBC, UA None seen 0 - 2 /hpf    Epithelial Cells (non renal) None seen 0 - 10 /hpf    Bacteria, UA None seen None seen/Few /hpf       Objective   /60   Pulse 95   Temp 97.7 °F (36.5 °C)   Resp 16   Ht 175.3 cm (69\")   Wt 87.1 kg (192 lb)   SpO2 98%   BMI 28.35 kg/m²   Body mass index is 28.35 kg/m².      Recent Vitals       10/27/2019 10/31/2019 1/26/2022       BP: 121/53 116/84 --     Pulse: 52 88 95     Temp: 98.6 °F (37 °C) 98.8 °F (37.1 °C) 97.7 °F (36.5 °C)     Weight: -- 88.9 kg (196 lb) 87.1 kg (192 lb)     BMI (Calculated): -- 27.3 28.3           Physical Exam  GENERAL:  Well nourished/developed in no acute distress.   SKIN: Turgor excellent, without wound, rash, lesion  HEENT: Normal cephalic without trauma.  Pupils equal round reactive to light. Extraocular motions full without nystagmus.   External canals nonobstructive nontender without reddness. Tymphatic membranes carmela with megan structures intact.   Oral cavity without growths, exudates, and moist.  Posterior pharynx without mass, obstruction, redness.  No thyromegaly, mass, tenderness, lymphadenopathy and supple.  CV: Regular rhythm.  No murmur, gallop, edema. Posterior pulses intact.  No carotid bruits.  CHEST: No chest wall tenderness or mass.   LUNGS: Symmetric motion with clear to auscultation. "   ABD: Soft, nontender without mass.   ORTHO: Symmetric extremities without swelling/point tenderness.  Full gross range of motion.  NEURO: CN 2-12 grossly intact.  Symmetric facies and UE/LE. 4/5 strength throughout. 1/4 x bicep equal reflexes.  Nonfocal use extremities. Speech clear.    PSYCH: Oriented x 3.  Pleasant calm, well kept. Purposeful/directed conservation with intact short/long gross memory.     Assessment/Plan     1. Examination, physical, employee    2. Primary hypertension    3. Uncontrolled type 1 diabetes mellitus with hyperglycemia (HCC)    4. Vaccine counseling        Discussion:  BP ok  Labs needed for form; lipid  Suggest other also    Immunization History   Administered Date(s) Administered   • Flu Vaccine Quad PF >18YRS 11/15/2016   • flucelvax quad pfs =>4 YRS 10/27/2019     Vaccine reviewed: today none; later strongly advise covid- covid booster minimum  Screening reviewed/updated    Medical decision issues:   Data review above:   Rx: reviewed and decisions:   Same Rx for now     Visit today involved chronic significant medical problems or differentials and/or intensive drug monitoring: ie potential to cause serious morbidity or death:   No orders of the defined types were placed in this encounter.      Orders placed:   LAB/Testing/Referrals: reviewed/orders:   Today:   Orders Placed This Encounter   Procedures   • Comprehensive Metabolic Panel   • Lipid Panel   • Hemoglobin A1c   • CBC & Differential     Chronic/recurrent labs above or change to:   same     Health maintenance:   Body mass index is 28.35 kg/m².  Patient's Body mass index is 28.35 kg/m². indicating that he is overweight (BMI 25-29.9). Obesity-related health conditions include the following: diabetes mellitus. Obesity is unchanged. BMI is is above average; BMI management plan is completed. We discussed portion control and increasing exercise..      Tobacco use reviewed:   Enrique ANASTASIA Rodríguez II  reports that he is a non-smoker  but has been exposed to tobacco smoke. He has never used smokeless tobacco..     There are no Patient Instructions on file for this visit.    Follow up: Return for fasting labs here when we think can be done here then Dr Louie as needed.  No future appointments.

## 2022-06-21 ENCOUNTER — TELEPHONE (OUTPATIENT)
Dept: FAMILY MEDICINE CLINIC | Facility: CLINIC | Age: 32
End: 2022-06-21

## 2022-06-21 NOTE — TELEPHONE ENCOUNTER
Vijaya from [address] [place] [address] called and advised that they did order his labs and unsure why they were under Dr Louie, did fax labs to [facility], will call back if doesn't receive     (that office also use lab ronel)

## 2022-06-21 NOTE — TELEPHONE ENCOUNTER
"----- Message from Sandra Bolivar LPN sent at 6/21/2022 12:26 PM CDT -----  Pt return call per  \"the only labs I have had done is with my diabetic doctor at [address] [place] [address] Dr Milton\"  "

## 2022-06-21 NOTE — TELEPHONE ENCOUNTER
"Called pt and advised that \"I did get last 6-16-22, but Dr REID Milton at [address] [place] [address], im not sure how Dr Louie got this results. Dr Javier Milton is the one that normally calls\"    Added dr Milton to pt care team and routed labs to his office    Attempted to call that office and vm left TRC  "

## 2022-06-21 NOTE — TELEPHONE ENCOUNTER
I'm looking at lab from 6.16.22; is that what he is talking about?   (did this lab get assigned to him/filed wrong)?    I was just letting him know this A1c is too high    If this is not his lab; apologize to patient AND refer this to Crissy to fix

## 2022-07-20 ENCOUNTER — TELEPHONE (OUTPATIENT)
Dept: FAMILY MEDICINE CLINIC | Facility: CLINIC | Age: 32
End: 2022-07-20

## 2022-07-20 NOTE — TELEPHONE ENCOUNTER
PATIENT REQUESTING SAMPLES OF HUMALOG INSULIN     PATIENTS STATES HE IS OUT OF INSULIN AND DOESN'T GET PAID UNTIL NEXT WEEK    GOOD CALL BACK   [phone]

## 2023-05-05 ENCOUNTER — OUTSIDE FACILITY SERVICE (OUTPATIENT)
Dept: FAMILY MEDICINE CLINIC | Facility: CLINIC | Age: 33
End: 2023-05-05
Payer: COMMERCIAL

## 2023-05-06 ENCOUNTER — OUTSIDE FACILITY SERVICE (OUTPATIENT)
Dept: FAMILY MEDICINE CLINIC | Facility: CLINIC | Age: 33
End: 2023-05-06
Payer: COMMERCIAL

## 2023-05-08 ENCOUNTER — TELEPHONE (OUTPATIENT)
Dept: FAMILY MEDICINE CLINIC | Facility: CLINIC | Age: 33
End: 2023-05-08
Payer: COMMERCIAL

## 2023-05-08 ENCOUNTER — DOCUMENTATION (OUTPATIENT)
Dept: FAMILY MEDICINE CLINIC | Facility: CLINIC | Age: 33
End: 2023-05-08
Payer: COMMERCIAL

## 2023-05-08 DIAGNOSIS — E11.65 UNCONTROLLED TYPE 2 DIABETES MELLITUS WITH HYPERGLYCEMIA: Primary | ICD-10-CM

## 2023-05-08 DIAGNOSIS — E10.10 DIABETIC KETOACIDOSIS WITHOUT COMA ASSOCIATED WITH TYPE 1 DIABETES MELLITUS: ICD-10-CM

## 2023-05-08 DIAGNOSIS — E10.649 HYPOGLYCEMIA DUE TO TYPE 1 DIABETES MELLITUS: ICD-10-CM

## 2023-05-08 RX ORDER — PANTOPRAZOLE SODIUM 40 MG/1
40 TABLET, DELAYED RELEASE ORAL DAILY
Qty: 30 TABLET | Refills: 1 | Status: SHIPPED | OUTPATIENT
Start: 2023-05-08 | End: 2023-05-08 | Stop reason: SDUPTHER

## 2023-05-08 RX ORDER — BLOOD-GLUCOSE SENSOR
1 EACH MISCELLANEOUS DAILY
Qty: 3 EACH | Refills: 3 | Status: SHIPPED | OUTPATIENT
Start: 2023-05-08 | End: 2023-05-08 | Stop reason: SDUPTHER

## 2023-05-08 RX ORDER — BLOOD-GLUCOSE,RECEIVER,CONT
1 EACH MISCELLANEOUS DAILY
Qty: 1 EACH | Refills: 3 | Status: SHIPPED | OUTPATIENT
Start: 2023-05-08 | End: 2023-05-08 | Stop reason: SDUPTHER

## 2023-05-08 RX ORDER — PANTOPRAZOLE SODIUM 40 MG/1
40 TABLET, DELAYED RELEASE ORAL DAILY
Qty: 30 TABLET | Refills: 1 | Status: SHIPPED | OUTPATIENT
Start: 2023-05-08

## 2023-05-08 RX ORDER — BLOOD-GLUCOSE,RECEIVER,CONT
1 EACH MISCELLANEOUS DAILY
Qty: 1 EACH | Refills: 3 | Status: SHIPPED | OUTPATIENT
Start: 2023-05-08

## 2023-05-08 RX ORDER — BLOOD-GLUCOSE SENSOR
1 EACH MISCELLANEOUS DAILY
Qty: 3 EACH | Refills: 3 | Status: SHIPPED | OUTPATIENT
Start: 2023-05-08

## 2023-05-08 NOTE — TELEPHONE ENCOUNTER
Confirm work note meets his needs    Advise him [facility] has Rx for  A. protonix  B. dexcom 7 reader/device    Referral started for endocrinology; he needs one    Call dexcom rep; ask him to call patient and help with any set up/insurance issues    Note dx for DM stuff  Uncontrolled DM1 with hyperglycemia and hypoglycemia  DKA; admitted weekend

## 2023-05-08 NOTE — TELEPHONE ENCOUNTER
Patient stated that he needed his letter to be dated thru the 9th not the 10th, letter re done & picked up.  Patient is aware of meds sent to pharmacy.    Terri,    The patient would like the referral to an endocrinologist in [place] due to convenience for him.

## 2023-05-09 NOTE — PROGRESS NOTES
"Epic numbers:   Patient ID: Enrique Rodríguez II  MRN: [id number]                                  [facility]  Discharge summary     Patient ID: Enrique Rodríguez II  MRN: [id number]                                    Acct:  [id number]  Admit Date: 5.5.23  Date of Discharge: 5.6.23  Date of service: 5.6.23     SOURCE: The source of this information is prior knowledge of the patient, review of his office records, his current chart, as well as discussion with he and ED personal; all are considered reliable.      PATIENT PROFILE: The patient is a 34 y/o white male resident of [place]; he was cooperative.      CHIEF COMPLAINT: \"his BS is high\"     HISTORY OF PRESENT ILLNESS: Enrique presents the ER with concern of nausea and chest pain.  This is started 2 days before.  It was unassociated with fever nasal congestion drainage sore throat dental pain cough wheeze diarrhea constipation abdominal pain dysuria any open sores or rashes.  He did not think he had a fever.  In the ED he was found to have a blood sugar of 710 a creatinine of 1.14, GFR of 74 white count of 20,000 hemoglobin 14.5 some left shift with ABGs of base excess -12.6 oxygen sat 98 room air pH 7.37 PCO2 22 bicarb 12.7 serum bicarb 10 lactic acid 3.42 with procalcitonin 0.1 and urinalysis is 2+ glucose and 4+ ketones and Pacific gravity 1.010.  Drug screen was negative.  COVID test was negative.  Cardiac enzymes twice were less than 0.012 troponins 0.06 CK-MB liver profile was negative.  A1c was 12.8.  His sugar was improved somewhat in the ER and we elected to admit to deal with his DKA.  There have been no recent exertional chest pain.           Allergies   Allergen Reactions   • Other         CATS- Sneezing         HOME MEDICATIONS:  Lantus 30 units twice daily  Sliding scale Humalog  Lipitor 40 a day  Clonidine 0.1 daily as needed anxiety  Lisinopril 10 mg a day     PAST HISTORY:  CHILDHOOD: unremarkable.      PROCEDURES:    " SCANNED     SURGERIES:  Skin Graft R Ankle/age 7     FAMILY HISTORY:  HTN/  DM/gm-p,ggf-m,ggm-m  Heart/gf-m     HABITS:  Tobacco-smoker/none  Tobacco-2nd handed/none     SOCIAL HISTORY:  Employment/School system/  Single/+     HOSPITAL NOTES: [facility]  8.22.04-8.25.04 DKA     HOSPITAL NOTES: [facility]  6.26.16-6.28.16  dehydration (SG 10.34, B 55, Cr 3.17/GFR 24,    others)  renal insufficiency-acute  nausea-acute  nausea with vomiting-acute  diarrhea-acute  abdominal pain-acute  diabetes-1 uncontrolled (A1c 9.5)  abdominal pain-? chronic  abnormal gb EF 28% (CCK symptoms)     10.26.19-10.27.19  DKA  Hypotension (with hypertension/Rx)  Hyperglycemia: 573  Metabolic acidosis 7.298  Lactic acid elevation  hypophosphoratemia 1.8  Dehydration  Acute renal insufficiency-injury  Nausea with vomiting  leukocytosis  Uncontrolled DM1     Chloe:   none     Review of Systems  GENERAL:  Active home/work. Sleep is ok. No fever before/with this.  ENDO:  No syncope, near or diaphoretic sweaty spells.  BS always variable; follows with 4-rivers.  HEENT: No head injury or headache.   No vision change.   No hearing loss.  Ears without pain/drainage.  No sore throat.  No nasal/sinus congestion/drainage. No epistaxis.  CHEST: No chest wall tenderness or mass. No cough, wheeze, SOB; no hemoptysis.  CV: No prior chest pain, palpitations, ankle edema.  GI: No heartburn, dysphagia.  No abdominal pain, diarrhea, constipation, rectal bleeding, or melena.  :  Voids without dysuria, or incontinence to completion.  ORTHO: No painful/swollen joints but various on /off sore.  No sore neck or back.  No acute neck or back pain without recent injury.   NEURO: No dizziness, weakness of extremities.  No numbness/paresthesias.   PSYCH: No memory loss.  Mood good; not one to be anxious, depressed but/and not suicidal.  One to tolerate stress well.  No significant active stress.      PHYSICAL EXAMINATION:  T: 96.8 F P: 68/min  RR: 14/min BP: 103/55 Wt:  179 lbs Ht: 70 in     Physical Exam  GENERAL:  Well nourished/developed in no acute distress.   SKIN: Turgor excellent, without wound, rash, lesion.  HEENT: Normocephalic without trauma.  Pupils equal round reactive to light. Extraocular motions full without nystagmus.     External canals nonobstructive nontender without redness.  Oral cavity without growths, exudates, and moist.  Posterior pharynx without mass, obstruction, redness.  No thyromegaly, mass, tenderness, lymphadenopathy and supple.  CV: Regular rhythm.  No murmur, gallop, edema. Posterior pulses intact.  No carotid bruits.   CHEST: No chest wall tenderness or mass.   LUNGS: Symmetric motion with clear to auscultation.   ABD: Soft, nontender without mass.   ORTHO: Symmetric extremities without swelling/point tenderness.  Full gross range of motion.    NEURO: CN 2-12 grossly intact.  Symmetric facies. 1/4 x bicep equal reflexes.  UE/LE   3-4/5 strength throughout.  Nonfocal use extremities. Speech clear.    PSYCH: Oriented x 3.  Pleasant calm, well kept.  Purposeful/directed conversation with intact short/long gross memory.     ASSESSMENT/PROBLEM LIST:   32 y/o white male   Allergy/intolerance: see above  Procedural history: see above  Family history: see above  DM1-uncontrolled  Proteinuria  Hypertension  Hyperlipidemia  Allergic rhinitis  History migraines  Anxiety and depression     REASON FOR ADMISSION:    DKA (acidosis, ketosis, hyperglycemia)  Nausea without vomiting  Chest pain; atypical with negative enzymes  Leukocytosis without obvious cause    HOSPITAL COARSE:   He required aggressive fluids; in fact 2 we turned up his fluids his potassium went into the 6 range and he became nauseous again.  We gave emergent IV insulin with insulin drip and he only required this for about 2 hours.  As expected his potassium drifted towards low and his blood sugar what at times becomes somewhat mildly hypoglycemic; he remained asymptomatic with that.  We slowly  withdrew the fluids the extra insulin back to his baseline medications and advance his diet with all being tolerated.  He never ran fever or had acute abdominal pain or any further chest pain.  Telemetry monitoring was unremarkable    Culture Results: None    DISCHARGE ASSESSMENT:  Reasons for admit/problems addressed while here:   DKA (acidosis, ketosis, hyperglycemia)  Nausea without vomiting  Chest pain; atypical with negative enzymes  Leukocytosis without obvious cause  Hyperkalemia  hypoglycemia    Chronic problems affecting stay:  See above      PLAN:   See AVS    Discharge Disposition:  Home    Discharge Medications:  Lantus 30 twice daily  Same home sliding scale insulin with meals and 4 hours awake  Lisinopril 10 mg a day  Protonix 40 mg a day    Discharge Care Plan/Instructions:   Diet 2000-calorie ADA with 60 ounces of fluid per 24 hours  Till May 9; stop by office for work note on the eighth  Office will arrange referral to endocrinology  Office will work to get him set up with the Dexcom on 7 drug rep support to get this running    Follow-up Appointments:   Dr. Louie [facility] f/u extra to be decided (call for time)    Other appointments:   No future appointments.    CONDITION: stable/improved    PROGNOSIS: guarded    TIME: 38 minutes was spent reviewing diagnosis lists, Rx lists, labs, radiology reports; talking/messaging nursing, talking with patient/family, and ordering Rx, labs, diet and making other decisions for care plan.

## 2023-05-09 NOTE — PROGRESS NOTES
"Epic numbers:   Patient ID: Enrique Rodríguez II  MRN: [id number]       [facility]  History and Physicial    Patient ID: Enrique Rodríguez II  MRN: [id number]     Acct:  [id number]  Admit Date: 5.5.23  Date of service: 5.5.23    SOURCE: The source of this information is prior knowledge of the patient, review of his office records, his current chart, as well as discussion with he and ED personal; all are considered reliable.      PATIENT PROFILE: The patient is a 32 y/o white male resident of [place]; he was cooperative.      CHIEF COMPLAINT: \"his BS is high\"     HISTORY OF PRESENT ILLNESS: Enrique presents the ER with concern of nausea and chest pain.  This is started 2 days before.  It was unassociated with fever nasal congestion drainage sore throat dental pain cough wheeze diarrhea constipation abdominal pain dysuria any open sores or rashes.  He did not think he had a fever.  In the ED he was found to have a blood sugar of 710 a creatinine of 1.14, GFR of 74 white count of 20,000 hemoglobin 14.5 some left shift with ABGs of base excess -12.6 oxygen sat 98 room air pH 7.37 PCO2 22 bicarb 12.7 serum bicarb 10 lactic acid 3.42 with procalcitonin 0.1 and urinalysis is 2+ glucose and 4+ ketones and Pacific gravity 1.010.  Drug screen was negative.  COVID test was negative.  Cardiac enzymes twice were less than 0.012 troponins 0.06 CK-MB liver profile was negative.  A1c was 12.8.  His sugar was improved somewhat in the ER and we elected to admit to deal with his DKA.  There have been no recent exertional chest pain.    Allergies   Allergen Reactions   • Other      CATS- Sneezing       HOME MEDICATIONS:  Lantus 30 units twice daily  Sliding scale Humalog  Lipitor 40 a day  Clonidine 0.1 daily as needed anxiety  Lisinopril 10 mg a day    PAST HISTORY:  CHILDHOOD: unremarkable.     PROCEDURES:    SCANNED    SURGERIES:  Skin Graft R Ankle/age 7    FAMILY " HISTORY:  HTN/  DM/gm-p,ggf-m,ggm-m  Heart/gf-m    HABITS:  Tobacco-smoker/none  Tobacco-2nd handed/none    SOCIAL HISTORY:  Employment/School system/  Single/+    HOSPITAL NOTES: [facility]  8.22.04-8.25.04 DKA    HOSPITAL NOTES: [facility]  6.26.16-6.28.16  dehydration (SG 10.34, B 55, Cr 3.17/GFR 24,    others)  renal insufficiency-acute  nausea-acute  nausea with vomiting-acute  diarrhea-acute  abdominal pain-acute  diabetes-1 uncontrolled (A1c 9.5)  abdominal pain-? chronic  abnormal gb EF 28% (CCK symptoms)    10.26.19-10.27.19  DKA  Hypotension (with hypertension/Rx)  Hyperglycemia: 573  Metabolic acidosis 7.298  Lactic acid elevation  hypophosphoratemia 1.8  Dehydration  Acute renal insufficiency-injury  Nausea with vomiting  leukocytosis  Uncontrolled DM1    Chloe:   none    Review of Systems  GENERAL:  Active home/work. Sleep is ok. No fever before/with this.  ENDO:  No syncope, near or diaphoretic sweaty spells.  BS always variable; follows with 4-rivers.  HEENT: No head injury or headache.   No vision change.   No hearing loss.  Ears without pain/drainage.  No sore throat.  No nasal/sinus congestion/drainage. No epistaxis.  CHEST: No chest wall tenderness or mass. No cough, wheeze, SOB; no hemoptysis.  CV: No prior chest pain, palpitations, ankle edema.  GI: No heartburn, dysphagia.  No abdominal pain, diarrhea, constipation, rectal bleeding, or melena.  :  Voids without dysuria, or incontinence to completion.  ORTHO: No painful/swollen joints but various on /off sore.  No sore neck or back.  No acute neck or back pain without recent injury.   NEURO: No dizziness, weakness of extremities.  No numbness/paresthesias.   PSYCH: No memory loss.  Mood good; not one to be anxious, depressed but/and not suicidal.  One to tolerate stress well.  No significant active stress.     PHYSICAL EXAMINATION:  T: 96.8 F P: 68/min  RR: 14/min BP: 103/55 Wt: 179 lbs Ht: 70 in    Physical Exam  GENERAL:  Well  nourished/developed in no acute distress.   SKIN: Turgor excellent, without wound, rash, lesion.  HEENT: Normocephalic without trauma.  Pupils equal round reactive to light. Extraocular motions full without nystagmus.     External canals nonobstructive nontender without redness.  Oral cavity without growths, exudates, and moist.  Posterior pharynx without mass, obstruction, redness.  No thyromegaly, mass, tenderness, lymphadenopathy and supple.  CV: Regular rhythm.  No murmur, gallop, edema. Posterior pulses intact.  No carotid bruits.   CHEST: No chest wall tenderness or mass.   LUNGS: Symmetric motion with clear to auscultation.   ABD: Soft, nontender without mass.   ORTHO: Symmetric extremities without swelling/point tenderness.  Full gross range of motion.    NEURO: CN 2-12 grossly intact.  Symmetric facies. 1/4 x bicep equal reflexes.  UE/LE   3-4/5 strength throughout.  Nonfocal use extremities. Speech clear.    PSYCH: Oriented x 3.  Pleasant calm, well kept.  Purposeful/directed conversation with intact short/long gross memory.     ASSESSMENT/PROBLEM LIST:   34 y/o white male   Allergy/intolerance: see above  Procedural history: see above  Family history: see above  DM1-uncontrolled  Proteinuria  Hypertension  Hyperlipidemia  Allergic rhinitis  History migraines  Anxiety and depression    REASON FOR ADMISSION:    DKA (acidosis, ketosis, hyperglycemia)  Nausea without vomiting  Chest pain; atypical with negative enzymes  Leukocytosis without obvious cause    PLANS:   Rx-reviewed; ordered as needed and will review daily/as needed.   Includes anticoagulation for DVT prevention or antiembolic stockings as appropriate.  Others: antiemetic prn  LAB-reviewed; ordered as needed and will review daily.  Particular: frequent chemistry, BS and daily other/more complete  Imaging/cardiology testing-reviewed; ordered as needed and will review daily.  Particular:  None planned  Consults none  Diet: ADA  Fluids: aggressive  IV; oral as able  Special issues: watch for signs/source of any infection  Discharge planning: he expects home  Code status: full

## 2023-05-10 ENCOUNTER — TELEPHONE (OUTPATIENT)
Dept: FAMILY MEDICINE CLINIC | Facility: CLINIC | Age: 33
End: 2023-05-10

## 2023-05-10 NOTE — TELEPHONE ENCOUNTER
Caller: GEN    Relationship: NURSE     Best call back number: [phone]        Who are you requesting to speak with (clinical staff, provider,  specific staff member): CLINICAL STAFF    Do you know the name of the person who called: GEN    What was the call regarding: WANTED TO MAKE SURE YOU KNEW PATIENTWAS ADMITTED, 5/5/23 TO [facility] GEN DIDN'T KNOW RELEASE DATE    Do you require a callback: ANN--GEN HAS TRIED TO REACH PATIENT TO SCHEDULE HOSPITAL FOLLOW UP WITH NO LUCK

## 2024-11-20 ENCOUNTER — OFFICE VISIT (OUTPATIENT)
Dept: FAMILY MEDICINE CLINIC | Facility: CLINIC | Age: 34
End: 2024-11-20
Payer: COMMERCIAL

## 2024-11-20 VITALS
BODY MASS INDEX: 28.73 KG/M2 | WEIGHT: 194 LBS | OXYGEN SATURATION: 97 % | SYSTOLIC BLOOD PRESSURE: 128 MMHG | DIASTOLIC BLOOD PRESSURE: 70 MMHG | HEIGHT: 69 IN | HEART RATE: 74 BPM

## 2024-11-20 DIAGNOSIS — R11.0 NAUSEA: Primary | ICD-10-CM

## 2024-11-20 DIAGNOSIS — R19.7 DIARRHEA, UNSPECIFIED TYPE: ICD-10-CM

## 2024-11-20 PROCEDURE — 99213 OFFICE O/P EST LOW 20 MIN: CPT | Performed by: NURSE PRACTITIONER

## 2024-11-20 RX ORDER — PROMETHAZINE HYDROCHLORIDE 12.5 MG/1
12.5 TABLET ORAL EVERY 6 HOURS PRN
COMMUNITY
End: 2024-11-20 | Stop reason: SDUPTHER

## 2024-11-20 RX ORDER — PROMETHAZINE HYDROCHLORIDE 12.5 MG/1
12.5 TABLET ORAL EVERY 6 HOURS PRN
Qty: 15 TABLET | Refills: 0 | Status: SHIPPED | OUTPATIENT
Start: 2024-11-20

## 2024-11-20 NOTE — PROGRESS NOTES
"Chief Complaint  Vomiting and Diarrhea    Subjective      Enrique Rodríguez II presents to [facility]  HPI: Patient is a 34 y.o. male     History of Present Illness  The patient presents for evaluation of diarrhea.    He reports that his three children have recently had a stomach bug, and he himself began experiencing symptoms around 11:00 today. His symptoms include nausea, diarrhea, and stomach cramps. He has been consuming Gatorade to maintain hydration. . He has run out of promethazine, a medication previously prescribed for nausea, which he found helpful. He requests a work note for today and tomorrow.    Reports he goes to [employer] [facility] to [employer] [place], he manages chronic conditions.    Results         Objective   Vital Signs:  /70   Pulse 74   Ht 175.3 cm (69\")   Wt 88 kg (194 lb)   SpO2 97%   BMI 28.65 kg/m²   Estimated body mass index is 28.65 kg/m² as calculated from the following:    Height as of this encounter: 175.3 cm (69\").    Weight as of this encounter: 88 kg (194 lb).    BMI is >= 25 and <30. (Overweight) The following options were offered after discussion;: exercise counseling/recommendations      Physical Exam  Constitutional:       Appearance: Normal appearance.   Cardiovascular:      Rate and Rhythm: Normal rate and regular rhythm.   Pulmonary:      Effort: Pulmonary effort is normal.      Breath sounds: Normal breath sounds.   Abdominal:      General: Bowel sounds are normal.      Palpations: Abdomen is soft.      Tenderness: There is no abdominal tenderness.   Neurological:      Mental Status: He is alert.   Psychiatric:         Mood and Affect: Mood normal.        Result Review :  The following labs/imaging/notes were reviewed and discussed with the patient by KATHLEEN Mix on 11/20/2024:             Assessment and Plan   Diagnoses and all orders for this visit:    1. Nausea (Primary)  -     promethazine (PHENERGAN) 12.5 MG " tablet; Take 1 tablet by mouth Every 6 (Six) Hours As Needed for Nausea or Vomiting.  Dispense: 15 tablet; Refill: 0    2. Diarrhea, unspecified type          Assessment & Plan  1. Diarrhea.  He was advised to avoid antidiarrheals unless symptoms persist beyond a week. If  diarrhea persists beyond a week, a follow-up visit will be necessary. He was also advised to maintain hydration with options like Propel or Gatorade Zero and avoid spicy or fatty foods until recovery. A work note was issued for the 22nd.    2. Nausea.  A prescription for Phenergan, to be taken as needed for nausea, was provided. He was informed that Phenergan may cause drowsiness.               Follow Up  No follow-ups on file.  Patient was given instructions and counseling regarding his condition or for health maintenance advice. Please see specific information pulled into the AVS if appropriate.   Patient or patient representative verbalized consent for the use of Ambient Listening during the visit with  KATHLEEN Mix for chart documentation. 11/20/2024  13:28 CST

## 2024-11-20 NOTE — LETTER
November 20, 2024     Patient: Enrique Rodríguez II   YOB: 1990   Date of Visit: 11/20/2024       To Whom It May Concern:    It is my medical opinion that Enrique Rodríguez may return to work on 11/22/24.         Sincerely,        KATHLEEN Mix    CC: No Recipients

## 2025-04-23 ENCOUNTER — TELEPHONE (OUTPATIENT)
Dept: FAMILY MEDICINE CLINIC | Facility: CLINIC | Age: 35
End: 2025-04-23
Payer: COMMERCIAL

## 2025-04-23 NOTE — TELEPHONE ENCOUNTER
Called pt to state he needs scheduled for his annual physical with [facility]- no answer left  hub to relay